# Patient Record
Sex: MALE | Race: WHITE | Employment: OTHER | URBAN - NONMETROPOLITAN AREA
[De-identification: names, ages, dates, MRNs, and addresses within clinical notes are randomized per-mention and may not be internally consistent; named-entity substitution may affect disease eponyms.]

---

## 2017-01-24 ENCOUNTER — HOSPITAL ENCOUNTER (EMERGENCY)
Facility: HOSPITAL | Age: 62
Discharge: HOME | End: 2017-01-24
Attending: PHYSICIAN ASSISTANT | Admitting: PHYSICIAN ASSISTANT
Payer: COMMERCIAL

## 2017-01-24 VITALS
RESPIRATION RATE: 16 BRPM | TEMPERATURE: 98.2 F | DIASTOLIC BLOOD PRESSURE: 60 MMHG | OXYGEN SATURATION: 93 % | SYSTOLIC BLOOD PRESSURE: 107 MMHG

## 2017-01-24 DIAGNOSIS — S00.83XA CONTUSION OF FACE, SCALP AND NECK, INITIAL ENCOUNTER: ICD-10-CM
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
## 2017-01-24 DIAGNOSIS — S10.93XA CONTUSION OF FACE, SCALP AND NECK, INITIAL ENCOUNTER: ICD-10-CM

## 2017-01-24 DIAGNOSIS — S00.03XA CONTUSION OF FACE, SCALP AND NECK, INITIAL ENCOUNTER: ICD-10-CM

## 2017-01-24 DIAGNOSIS — F10.10 ETOH ABUSE: ICD-10-CM

## 2017-01-24 LAB
ALBUMIN SERPL-MCNC: 3.5 G/DL (ref 3.4–5)
ALP SERPL-CCNC: 122 U/L (ref 40–150)
ALT SERPL W P-5'-P-CCNC: 43 U/L (ref 0–70)
ANION GAP SERPL CALCULATED.3IONS-SCNC: 6 MMOL/L (ref 3–14)
APTT PPP: 31 SEC (ref 24–37)
AST SERPL W P-5'-P-CCNC: 62 U/L (ref 0–45)
BASOPHILS # BLD AUTO: 0 10E9/L (ref 0–0.2)
BASOPHILS NFR BLD AUTO: 0.6 %
BILIRUB SERPL-MCNC: 0.2 MG/DL (ref 0.2–1.3)
BUN SERPL-MCNC: 5 MG/DL (ref 7–30)
CALCIUM SERPL-MCNC: 8.3 MG/DL (ref 8.5–10.1)
CHLORIDE SERPL-SCNC: 107 MMOL/L (ref 94–109)
CO2 SERPL-SCNC: 26 MMOL/L (ref 20–32)
CREAT SERPL-MCNC: 0.6 MG/DL (ref 0.66–1.25)
DIFFERENTIAL METHOD BLD: ABNORMAL
EOSINOPHIL # BLD AUTO: 0.1 10E9/L (ref 0–0.7)
EOSINOPHIL NFR BLD AUTO: 1.8 %
ERYTHROCYTE [DISTWIDTH] IN BLOOD BY AUTOMATED COUNT: 15 % (ref 10–15)
ETHANOL SERPL-MCNC: 0.34 G/DL
GFR SERPL CREATININE-BSD FRML MDRD: ABNORMAL ML/MIN/1.7M2
GLUCOSE SERPL-MCNC: 119 MG/DL (ref 70–99)
HCT VFR BLD AUTO: 45.3 % (ref 40–53)
HGB BLD-MCNC: 15.7 G/DL (ref 13.3–17.7)
IMM GRANULOCYTES # BLD: 0.1 10E9/L (ref 0–0.4)
IMM GRANULOCYTES NFR BLD: 0.7 %
INR PPP: 0.81 (ref 0.8–1.2)
LYMPHOCYTES # BLD AUTO: 2.9 10E9/L (ref 0.8–5.3)
LYMPHOCYTES NFR BLD AUTO: 43.4 %
MCH RBC QN AUTO: 33.5 PG (ref 26.5–33)
MCHC RBC AUTO-ENTMCNC: 34.7 G/DL (ref 31.5–36.5)
MCV RBC AUTO: 97 FL (ref 78–100)
MONOCYTES # BLD AUTO: 0.8 10E9/L (ref 0–1.3)
MONOCYTES NFR BLD AUTO: 11.4 %
NEUTROPHILS # BLD AUTO: 2.8 10E9/L (ref 1.6–8.3)
NEUTROPHILS NFR BLD AUTO: 42.1 %
NRBC # BLD AUTO: 0 10*3/UL
NRBC BLD AUTO-RTO: 0 /100
PLATELET # BLD AUTO: 468 10E9/L (ref 150–450)
POTASSIUM SERPL-SCNC: 3.4 MMOL/L (ref 3.4–5.3)
PROT SERPL-MCNC: 7.5 G/DL (ref 6.8–8.8)
RBC # BLD AUTO: 4.69 10E12/L (ref 4.4–5.9)
SODIUM SERPL-SCNC: 139 MMOL/L (ref 133–144)
WBC # BLD AUTO: 6.8 10E9/L (ref 4–11)

## 2017-01-24 PROCEDURE — 80320 DRUG SCREEN QUANTALCOHOLS: CPT | Performed by: PHYSICIAN ASSISTANT

## 2017-01-24 PROCEDURE — 85730 THROMBOPLASTIN TIME PARTIAL: CPT | Performed by: PHYSICIAN ASSISTANT

## 2017-01-24 PROCEDURE — 85610 PROTHROMBIN TIME: CPT | Performed by: PHYSICIAN ASSISTANT

## 2017-01-24 PROCEDURE — 85025 COMPLETE CBC W/AUTO DIFF WBC: CPT | Performed by: PHYSICIAN ASSISTANT

## 2017-01-24 PROCEDURE — 99285 EMERGENCY DEPT VISIT HI MDM: CPT | Mod: 25

## 2017-01-24 PROCEDURE — 72125 CT NECK SPINE W/O DYE: CPT | Mod: TC

## 2017-01-24 PROCEDURE — 99283 EMERGENCY DEPT VISIT LOW MDM: CPT | Performed by: PHYSICIAN ASSISTANT

## 2017-01-24 PROCEDURE — 25000128 H RX IP 250 OP 636: Performed by: PHYSICIAN ASSISTANT

## 2017-01-24 PROCEDURE — 36415 COLL VENOUS BLD VENIPUNCTURE: CPT | Performed by: PHYSICIAN ASSISTANT

## 2017-01-24 PROCEDURE — 96360 HYDRATION IV INFUSION INIT: CPT

## 2017-01-24 PROCEDURE — 80053 COMPREHEN METABOLIC PANEL: CPT | Performed by: PHYSICIAN ASSISTANT

## 2017-01-24 PROCEDURE — 96361 HYDRATE IV INFUSION ADD-ON: CPT

## 2017-01-24 PROCEDURE — 70450 CT HEAD/BRAIN W/O DYE: CPT | Mod: TC

## 2017-01-24 RX ORDER — SODIUM CHLORIDE 9 MG/ML
INJECTION, SOLUTION INTRAVENOUS CONTINUOUS
Status: DISCONTINUED | OUTPATIENT
Start: 2017-01-24 | End: 2017-01-24 | Stop reason: HOSPADM

## 2017-01-24 RX ADMIN — SODIUM CHLORIDE: 9 INJECTION, SOLUTION INTRAVENOUS at 16:29

## 2017-01-24 RX ADMIN — SODIUM CHLORIDE 1000 ML: 9 INJECTION, SOLUTION INTRAVENOUS at 15:18

## 2017-01-24 NOTE — ED AVS SNAPSHOT
[facility]    [address]    [place] [address]    Phone:  [phone]                                       Andrew Virgen   MRN: [id number]    Department:  [facility]   Date of Visit:  1/24/2017           After Visit Summary Signature Page     I have received my discharge instructions, and my questions have been answered. I have discussed any challenges I see with this plan with the nurse or doctor.    ..........................................................................................................................................  Patient/Patient Representative Signature      ..........................................................................................................................................  Patient Representative Print Name and Relationship to Patient    ..................................................               ................................................  Date                                            Time    ..........................................................................................................................................  Reviewed by Signature/Title    ...................................................              ..............................................  Date                                                            Time
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
## 2017-01-24 NOTE — PROGRESS NOTES
Preliminary results for STAT imaging were received at 1602 (time on fax or preliminary report).    Preliminary results for STAT imaging were given to Sarah at 1602 (time) and scanned into PACs at 1602 (time).
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
## 2017-01-25 NOTE — ED NOTES
Home via taxi. Pt remains alert and oriented. GCS 15  Instructed on follow up and to return if worse, steady on feet.
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
## 2017-02-02 ENCOUNTER — HOSPITAL ENCOUNTER (EMERGENCY)
Facility: HOSPITAL | Age: 62
Discharge: HOME | End: 2017-02-02
Attending: FAMILY MEDICINE | Admitting: FAMILY MEDICINE
Payer: COMMERCIAL

## 2017-02-02 VITALS
RESPIRATION RATE: 20 BRPM | TEMPERATURE: 98.1 F | HEART RATE: 97 BPM | OXYGEN SATURATION: 99 % | DIASTOLIC BLOOD PRESSURE: 93 MMHG | SYSTOLIC BLOOD PRESSURE: 105 MMHG

## 2017-02-02 DIAGNOSIS — S82.832A OTHER CLOSED FRACTURE OF DISTAL END OF LEFT FIBULA, INITIAL ENCOUNTER: ICD-10-CM

## 2017-02-02 PROCEDURE — 99283 EMERGENCY DEPT VISIT LOW MDM: CPT | Performed by: FAMILY MEDICINE

## 2017-02-02 PROCEDURE — 73610 X-RAY EXAM OF ANKLE: CPT | Mod: TC,LT

## 2017-02-02 PROCEDURE — 73590 X-RAY EXAM OF LOWER LEG: CPT | Mod: TC,LT

## 2017-02-02 PROCEDURE — 99283 EMERGENCY DEPT VISIT LOW MDM: CPT

## 2017-02-02 RX ORDER — IBUPROFEN 800 MG/1
800 TABLET, FILM COATED ORAL EVERY 8 HOURS PRN
Qty: 60 TABLET | Refills: 0 | COMMUNITY
Start: 2017-02-02 | End: 2017-02-10

## 2017-02-02 ASSESSMENT — ENCOUNTER SYMPTOMS
ARTHRALGIAS: 1
GASTROINTESTINAL NEGATIVE: 1
ACTIVITY CHANGE: 1
CARDIOVASCULAR NEGATIVE: 1
RESPIRATORY NEGATIVE: 1
NUMBNESS: 0

## 2017-02-02 NOTE — ED PROVIDER NOTES
"  History     Chief Complaint   Patient presents with     Fall     L ankle pain     HPI  Andrew Virgen is a 61 year old male who was in the basement organizing his [employer] [place] and fell off his ankle.  He felt an immediate pop and had pain in the lateral ankle, was unable to walk any further on it.  He has some swelling, but good pulses and is moving the rest of the leg without difficulty.  He is intoxicated as usual.    I have reviewed the Medications, Allergies, Past Medical and Surgical History, and Social History in the Epic system.    Review of Systems   Constitutional: Positive for activity change.   HENT: Negative.         States he did not hit his head or lose consciousness.  Intoxicated, states he has had \"some beer\" today.   Respiratory: Negative.    Cardiovascular: Negative.    Gastrointestinal: Negative.    Musculoskeletal: Positive for arthralgias.        Left ankle.   Skin: Negative.         No bruising present.   Neurological: Negative for numbness.        CMS intact distal to break.       Physical Exam   BP: 111/87 mmHg  Pulse: 91  Temp: 97.8  F (36.6  C)  Resp: 18  SpO2: 100 %  Physical Exam   Constitutional: He is oriented to person, place, and time. He appears well-developed and well-nourished. No distress.   HENT:   Head: Normocephalic and atraumatic.   Neck: Normal range of motion. Neck supple.   Cardiovascular: Normal rate, regular rhythm, normal heart sounds and intact distal pulses.    No murmur heard.  Pulmonary/Chest: Effort normal and breath sounds normal.   Abdominal: Soft. Bowel sounds are normal. He exhibits no distension. There is no tenderness.   Musculoskeletal: Normal range of motion. He exhibits edema and tenderness.   Lateral left ankle pain, xray shows fibula fracture, oblique with minimal displacement.   Neurological: He is alert and oriented to person, place, and time.   Skin: Skin is warm and dry.   Psychiatric: He has a normal mood and affect.   Nursing note and vitals " reviewed.      ED Course   Procedures        Labs Ordered and Resulted from Time of ED Arrival Up to the Time of Departure from the ED - No data to display    Assessments & Plan (with Medical Decision Making)   Patient placed in cam walker boot on left ankle.  He was advised to leave it on excepting bathing, and to not bear weight on it.  Shown how to apply ice to area.  Follow up with Dr. Worthington in 3 days.    I have reviewed the nursing notes.    I have reviewed the findings, diagnosis, plan and need for follow up with the patient.    New Prescriptions    IBUPROFEN (ADVIL/MOTRIN) 800 MG TABLET    Take 1 tablet (800 mg) by mouth every 8 hours as needed for moderate pain       Final diagnoses:   Other closed fracture of distal end of left fibula, initial encounter       2/2/2017   [facility]      Yanna Watson MD  02/02/17 [id number]

## 2017-02-02 NOTE — ED AVS SNAPSHOT
[facility]    [address] [address]    Phone:  [phone]                                       Andrew Virgen   MRN: [id number]    Department:  [facility]   Date of Visit:  2/2/2017           After Visit Summary Signature Page     I have received my discharge instructions, and my questions have been answered. I have discussed any challenges I see with this plan with the nurse or doctor.    ..........................................................................................................................................  Patient/Patient Representative Signature      ..........................................................................................................................................  Patient Representative Print Name and Relationship to Patient    ..................................................               ................................................  Date                                            Time    ..........................................................................................................................................  Reviewed by Signature/Title    ...................................................              ..............................................  Date                                                            Time
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
## 2017-02-02 NOTE — ED AVS SNAPSHOT
[facility]    [address]    [place] [address]    Phone:  [phone]                                       Andrew Virgen   MRN: [id number]    Department:  [facility]   Date of Visit:  2/2/2017           Patient Information     Date Of Birth          1955        Your diagnoses for this visit were:     Other closed fracture of distal end of left fibula, initial encounter        You were seen by Yanna Watson MD.      Follow-up Information     Follow up with Gladis Worthington MD In 1 week.    Specialty:  Family Practice    Why:  follow up ankle    Contact information:    [facility] [place]  [address]  [place] [address]  [phone]          Discharge Instructions         Ankle Fracture, Distal Fibula  You have a fracture, or broken bone, of the end of the fibula bone. The fibula is one of two bones that support the ankle joint.    Home care    You will be given a splint, cast, or special boot to prevent movement at the injury site. Do not put weight on a splint. It will break. Follow your healthcare provider s advice about when to begin bearing weight on a cast or boot.    Keep your leg elevated when sitting or lying down. When sleeping, place a pillow under the injured leg. When sitting, support the injured leg so it is level with your waist. This is very important during the first 48 hours.    Keep the cast or splint completely dry at all times. When bathing, protect the cast or splint with 2 large plastic bags. Place 1 bag outside of the other. Tape each bag with duct tape at the top end. Water can still leak in even when the foot is covered. So it's best to keep the cast, splint, or boot away from water. If a fiberglass cast or splint gets wet, dry it with a hair dryer on a cool setting.    Place an ice pack over the injured area for no more than 15 to 20 minutes. Do this every 3 to 6 hours for the first 24 to 48 hours. Continue this 3 to  4 times a day as needed. To make an ice pack, put ice cubes in a plastic bag that seals at the top. Wrap the bag in a clean, thin towel or cloth. Never put ice or an ice pack directly on the skin. The ice pack can be put right on the cast or splint. As the ice melts, be careful that the cast or splint doesn t get wet.    You may use over-the-counter pain medicine to control pain, unless another pain medicine was prescribed. If you have chronic liver or kidney disease or ever had a stomach ulcer or GI bleeding, talk with your provider before using these medicines.  Follow-up care  Follow up with your healthcare provider in 1 week, or as advised. This is to be sure the bone is healing properly. If you were given a splint, it may be changed to a cast after the swelling goes down.  If X-rays were taken, you will be told of any new findings that may affect your care.  When to seek medical advice  Call your healthcare provider right away if any of these occur:    The plaster cast or splint becomes wet or soft    The fiberglass cast or splint stays wet for more than 24 hours    There is increased tightness or pain under the cast or splint    Your toes become swollen, cold, blue, numb, or tingly    The cast becomes loose    The cast has a bad smell    Sore areas develop under the cast    The cast develops cracks or breaks     2329-5878 [employer]. [address] [place] [address]. All rights reserved. This information is not intended as a substitute for professional medical care. Always follow your healthcare professional's instructions.             Review of your medicines      START taking        Dose / Directions Last dose taken    ibuprofen 800 MG tablet   Commonly known as:  ADVIL/MOTRIN   Dose:  800 mg   Quantity:  60 tablet        Take 1 tablet (800 mg) by mouth every 8 hours as needed for moderate pain   Refills:  0          Our records show that you are taking the medicines listed below. If  these are incorrect, please call your family doctor or clinic.        Dose / Directions Last dose taken    clopidogrel 75 MG tablet   Commonly known as:  PLAVIX   Dose:  75 mg   Quantity:  10 tablet        Take 1 tablet (75 mg) by mouth daily   Refills:  0        folic acid 1 MG tablet   Commonly known as:  FOLVITE   Dose:  1 mg   Quantity:  30 tablet        Take 1 tablet (1 mg) by mouth daily   Refills:  0        gabapentin 300 MG capsule   Commonly known as:  NEURONTIN   Dose:  300 mg   Quantity:  40 capsule        Take 1 capsule (300 mg) by mouth 4 times daily   Refills:  0        lisinopril 20 MG tablet   Commonly known as:  PRINIVIL/ZESTRIL   Dose:  20 mg   Quantity:  10 tablet        Take 1 tablet (20 mg) by mouth daily   Refills:  0        magnesium oxide 400 (241.3 MG) MG tablet   Commonly known as:  MAG-OX   Dose:  400 mg   Quantity:  7 tablet        Take 1 tablet (400 mg) by mouth daily   Refills:  0        metoprolol 25 MG 24 hr tablet   Commonly known as:  TOPROL-XL   Dose:  25 mg   Quantity:  10 tablet        Take 1 tablet (25 mg) by mouth daily   Refills:  0        multivitamin, therapeutic with minerals Tabs tablet   Dose:  1 tablet   Quantity:  30 each        Take 1 tablet by mouth daily   Refills:  0        nitroglycerin 0.4 MG sublingual tablet   Commonly known as:  NITROSTAT   Dose:  0.4 mg   Quantity:  10 tablet        Place 1 tablet (0.4 mg) under the tongue every 5 minutes as needed for chest pain At the 1st sign of attack; may repeat every 5 min until relief; if pain persists after 3 tablets in 15 min, prompt medial attention is recommended AS NEEDED   Refills:  0        order for DME   Quantity:  2 Units        Equipment being ordered: bilateral knee sleeves   Refills:  0        simvastatin 5 MG tablet   Commonly known as:  Zocor   Dose:  10 mg   Quantity:  30 tablet        Take 2 tablets (10 mg) by mouth daily   Refills:  0        tamsulosin 0.4 MG capsule   Commonly known as:  FLOMAX  "  Dose:  0.4 mg   Quantity:  30 capsule        Take 1 capsule (0.4 mg) by mouth daily   Refills:  0        thiamine 100 MG tablet   Dose:  100 mg   Quantity:  30 tablet        Take 1 tablet (100 mg) by mouth daily   Refills:  0                Prescriptions were sent or printed at these locations (1 Prescription)                   Other Prescriptions                Not Printed or Sent (1 of 1)         ibuprofen (ADVIL/MOTRIN) 800 MG tablet                Procedures and tests performed during your visit     Ankle XR, G/E 3 views, left    XR Tibia & Fibula Left 2 Views      Orders Needing Specimen Collection     None      Pending Results     Date and Time Order Name Status Description    2017 1236 XR Tibia & Fibula Left 2 Views Preliminary     2017 1236 Ankle XR, G/E 3 views, left Preliminary             Pending Culture Results     No orders found from 2017 to 2/3/2017.            Thank you for choosing [facility]       Thank you for choosing [facility] for your care. Our goal is always to provide you with excellent care. Hearing back from our patients is one way we can continue to improve our services. Please take a few minutes to complete the written survey that you may receive in the mail after you visit with us. Thank you!        [facility] Information     [facility] lets you send messages to your doctor, view your test results, renew your prescriptions, schedule appointments and more. To sign up, go to [email or web address] . Click on \"Log in\" on the left side of the screen, which will take you to the Welcome page. Then click on \"Sign up Now\" on the right side of the page.     You will be asked to enter the access code listed below, as well as some personal information. Please follow the directions to create your username and password.     Your access code is: [email or web address]  Expires: 2017  8:43 PM     Your access code will  in 90 days. If you need help or a new code, please call your [facility] clinic " or [phone].        Care EveryWhere ID     This is your Care EveryWhere ID. This could be used by other organizations to access your [place] medical records  [id number]        After Visit Summary       This is your record. Keep this with you and show to your community pharmacist(s) and doctor(s) at your next visit.
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
## 2017-02-02 NOTE — DISCHARGE INSTRUCTIONS
Ankle Fracture, Distal Fibula  You have a fracture, or broken bone, of the end of the fibula bone. The fibula is one of two bones that support the ankle joint.    Home care    You will be given a splint, cast, or special boot to prevent movement at the injury site. Do not put weight on a splint. It will break. Follow your healthcare provider s advice about when to begin bearing weight on a cast or boot.    Keep your leg elevated when sitting or lying down. When sleeping, place a pillow under the injured leg. When sitting, support the injured leg so it is level with your waist. This is very important during the first 48 hours.    Keep the cast or splint completely dry at all times. When bathing, protect the cast or splint with 2 large plastic bags. Place 1 bag outside of the other. Tape each bag with duct tape at the top end. Water can still leak in even when the foot is covered. So it's best to keep the cast, splint, or boot away from water. If a fiberglass cast or splint gets wet, dry it with a hair dryer on a cool setting.    Place an ice pack over the injured area for no more than 15 to 20 minutes. Do this every 3 to 6 hours for the first 24 to 48 hours. Continue this 3 to 4 times a day as needed. To make an ice pack, put ice cubes in a plastic bag that seals at the top. Wrap the bag in a clean, thin towel or cloth. Never put ice or an ice pack directly on the skin. The ice pack can be put right on the cast or splint. As the ice melts, be careful that the cast or splint doesn t get wet.    You may use over-the-counter pain medicine to control pain, unless another pain medicine was prescribed. If you have chronic liver or kidney disease or ever had a stomach ulcer or GI bleeding, talk with your provider before using these medicines.  Follow-up care  Follow up with your healthcare provider in 1 week, or as advised. This is to be sure the bone is healing properly. If you were given a splint, it may be changed to a  cast after the swelling goes down.  If X-rays were taken, you will be told of any new findings that may affect your care.  When to seek medical advice  Call your healthcare provider right away if any of these occur:    The plaster cast or splint becomes wet or soft    The fiberglass cast or splint stays wet for more than 24 hours    There is increased tightness or pain under the cast or splint    Your toes become swollen, cold, blue, numb, or tingly    The cast becomes loose    The cast has a bad smell    Sore areas develop under the cast    The cast develops cracks or breaks     2340-1087 [employer]. [address] [place] [address]. All rights reserved. This information is not intended as a substitute for professional medical care. Always follow your healthcare professional's instructions.

## 2017-02-04 NOTE — PROGRESS NOTES
Quick Note:    Left Lower Leg / Left Ankle XR reports routed to PCP, Dr. PARKER Worthington. Impression - mildly displaced fracture involving the distal fibula, mild degenerative changes of the ankle. Pt was seen in , CAM walker boot placed left lower leg and advised to follow up with PCP in 3 days.  ______

## 2017-02-04 NOTE — PROGRESS NOTES
The pt called wishing to discuss his left ankle pain. His speech is slurred. According to his record, he has been here many timed for ETOH issues.      I told the pt if he would like any further evaluation he would need to come in to be seen. The pt started to yell at me. I said we are open and will to reevaluate him if he chooses to come back in.    I discussed this pt with Dr Aviles. She agrees this pt should NOT be given narcotic pain medication due to his ETOH abuse.    02/02/2017  TWO VIEWS OF LEFT LOWER LEG     CLINICAL HISTORY: Pain, fall.     FINDINGS: There is a mildly displaced fracture seen in the distal  fibula, proximal to the distal tibiofibular articulation. There is a  tiny calcific density along the proximal aspect of the fibula that  appears to be more likely related to enthesophyte formation. Minimal  degenerative changes are seen within the knee, and mild degenerative  changes are seen within the ankle.     IMPRESSION:  1. MILDLY DISPLACED FRACTURE INVOLVING THE DISTAL FIBULA, PROXIMAL TO  THE DISTAL TIBIOFIBULAR JOINT.     2. MILD DEGENERATIVE CHANGES OF THE ANKLE AND TO A LESSER EXTENT THE  KNEE.     THREE VIEWS OF LEFT ANKLE     FINDINGS: There is a mildly displaced oblique fracture of the distal  fibula that appears to originate laterally proximal to the distal  tibiofibular joint, however, does extend into the distal tibiofibular  articulation. No additional fracture. Mild degenerative changes are  seen at the ankle.     IMPRESSION: MILDLY DISPLACED OBLIQUE DISTAL FIBULAR FACTURE, WITHOUT  EVIDENCE OF APPARENT ANKLE MORTISE COMPROMISE.        Bee Aguillon Certified  Physician Assistant  2/4/2017  3:24 PM  [facility]

## 2017-03-04 ENCOUNTER — HOSPITAL ENCOUNTER (EMERGENCY)
Facility: HOSPITAL | Age: 62
Discharge: HOME | End: 2017-03-04
Attending: PHYSICIAN ASSISTANT | Admitting: PHYSICIAN ASSISTANT
Payer: COMMERCIAL

## 2017-03-04 VITALS
SYSTOLIC BLOOD PRESSURE: 126 MMHG | RESPIRATION RATE: 16 BRPM | DIASTOLIC BLOOD PRESSURE: 81 MMHG | OXYGEN SATURATION: 16 % | HEART RATE: 88 BPM | TEMPERATURE: 98.1 F

## 2017-03-04 DIAGNOSIS — F10.10 ALCOHOL ABUSE: ICD-10-CM

## 2017-03-04 DIAGNOSIS — S82.832A CLOSED FRACTURE OF DISTAL END OF LEFT FIBULA, UNSPECIFIED FRACTURE MORPHOLOGY, INITIAL ENCOUNTER: ICD-10-CM

## 2017-03-04 PROCEDURE — 99283 EMERGENCY DEPT VISIT LOW MDM: CPT

## 2017-03-04 PROCEDURE — 73590 X-RAY EXAM OF LOWER LEG: CPT | Mod: TC,LT

## 2017-03-04 PROCEDURE — 73610 X-RAY EXAM OF ANKLE: CPT | Mod: TC,LT

## 2017-03-04 PROCEDURE — 99283 EMERGENCY DEPT VISIT LOW MDM: CPT | Performed by: PHYSICIAN ASSISTANT

## 2017-03-04 ASSESSMENT — ENCOUNTER SYMPTOMS
FEVER: 0
ABDOMINAL PAIN: 0
SHORTNESS OF BREATH: 0
HEADACHES: 0

## 2017-03-04 NOTE — ED AVS SNAPSHOT
[facility]    [address] [address]    Phone:  [phone]                                       Andrew Virgen   MRN: [id number]    Department:  [facility]   Date of Visit:  3/4/2017           Patient Information     Date Of Birth          1955        Your diagnoses for this visit were:     Closed fracture of distal end of left fibula, unspecified fracture morphology, initial encounter     Alcohol abuse        You were seen by Shira Stone PA-C.      Follow-up Information     Follow up with Carlitos Monte MD.    Specialty:  Orthopedics    Contact information:    [facility] [place]  [address]  [place] [address] [phone]          Follow up with Gladis Worthington MD.    Specialty:  Family Practice    Contact information:    [facility] [place]  [address]  [place] [address]  [phone]          Follow up with [facility].    Specialty:  EMERGENCY MEDICINE    Why:  If symptoms worsen    Contact information:    [address] [address] [phone]    Additional information:    From [facility]: Take [address]. Turn left at [address]. Turn left at the first stoplight on [address]. At the first stop sign, take a right onto [address]. Take a left into the parking lot and continue through until you reach the North enterance of the building.       From [place]: Take [address]. Take the [address] ramp towards [place]. Turn left onto [address]. Take a slight right onto [address] [email or web address] Turn left at the first stoplight on [address]. At the first stop sign, take a right onto [address]. Take a left into the parking lot and continue through until you reach the North enterance of the building.       From [place]: Take [address]. Take a right at [address]. At the first stop sign, take a right onto [address].  Take a left into the parking lot and continue through until you reach the [facility] of the building.         Discharge Instructions         Ankle Fracture, Distal Fibula  You have a fracture, or broken bone, of the end of the fibula bone. The fibula is one of two bones that support the ankle joint.    Home care    You will be given a splint, cast, or special boot to prevent movement at the injury site. Do not put weight on a splint. It will break. Follow your healthcare provider s advice about when to begin bearing weight on a cast or boot.    Keep your leg elevated when sitting or lying down. When sleeping, place a pillow under the injured leg. When sitting, support the injured leg so it is level with your waist. This is very important during the first 48 hours.    Keep the cast or splint completely dry at all times. When bathing, protect the cast or splint with 2 large plastic bags. Place 1 bag outside of the other. Tape each bag with duct tape at the top end. Water can still leak in even when the foot is covered. So it's best to keep the cast, splint, or boot away from water. If a fiberglass cast or splint gets wet, dry it with a hair dryer on a cool setting.    Place an ice pack over the injured area for no more than 15 to 20 minutes. Do this every 3 to 6 hours for the first 24 to 48 hours. Continue this 3 to 4 times a day as needed. To make an ice pack, put ice cubes in a plastic bag that seals at the top. Wrap the bag in a clean, thin towel or cloth. Never put ice or an ice pack directly on the skin. The ice pack can be put right on the cast or splint. As the ice melts, be careful that the cast or splint doesn t get wet.    You may use over-the-counter pain medicine to control pain, unless another pain medicine was prescribed. If you have chronic liver or kidney disease or ever had a stomach ulcer or GI bleeding, talk with your provider before using these medicines.  Follow-up care  Follow up with your  healthcare provider in 1 week, or as advised. This is to be sure the bone is healing properly. If you were given a splint, it may be changed to a cast after the swelling goes down.  If X-rays were taken, you will be told of any new findings that may affect your care.  When to seek medical advice  Call your healthcare provider right away if any of these occur:    The plaster cast or splint becomes wet or soft    The fiberglass cast or splint stays wet for more than 24 hours    There is increased tightness or pain under the cast or splint    Your toes become swollen, cold, blue, numb, or tingly    The cast becomes loose    The cast has a bad smell    Sore areas develop under the cast    The cast develops cracks or breaks     4965-2747 [employer]. [address] [place] [address]. All rights reserved. This information is not intended as a substitute for professional medical care. Always follow your healthcare professional's instructions.      Use your walking boot.    I have placed an order for orthopedics.  Your fracture has not started to heal.      Follow-up with Dr. Worthington as well.      ED Discharge Orders     ORTHOPEDICS ADULT REFERRAL       Your provider has referred you to: [facility]     Please be aware that coverage of these services is subject to the terms and limitations of your health insurance plan.  Call member services at your health plan with any benefit or coverage questions.      Please bring the following to your appointment:    >>   Any x-rays, CTs or MRIs which have been performed.  Contact the facility where they were done to arrange for  prior to your scheduled appointment.    >>   List of current medications   >>   This referral request   >>   Any documents/labs given to you for this referral                     Review of your medicines      Our records show that you are taking the medicines listed below. If these are incorrect, please call your family doctor  or clinic.        Dose / Directions Last dose taken    clopidogrel 75 MG tablet   Commonly known as:  PLAVIX   Dose:  75 mg   Quantity:  10 tablet        Take 1 tablet (75 mg) by mouth daily   Refills:  0        folic acid 1 MG tablet   Commonly known as:  FOLVITE   Dose:  1 mg   Quantity:  30 tablet        Take 1 tablet (1 mg) by mouth daily   Refills:  0        gabapentin 300 MG capsule   Commonly known as:  NEURONTIN   Dose:  300 mg   Quantity:  40 capsule        Take 1 capsule (300 mg) by mouth 4 times daily   Refills:  0        lisinopril 20 MG tablet   Commonly known as:  PRINIVIL/ZESTRIL   Dose:  20 mg   Quantity:  10 tablet        Take 1 tablet (20 mg) by mouth daily   Refills:  0        magnesium oxide 400 (241.3 MG) MG tablet   Commonly known as:  MAG-OX   Dose:  400 mg   Quantity:  7 tablet        Take 1 tablet (400 mg) by mouth daily   Refills:  0        metoprolol 25 MG 24 hr tablet   Commonly known as:  TOPROL-XL   Dose:  25 mg   Quantity:  10 tablet        Take 1 tablet (25 mg) by mouth daily   Refills:  0        multivitamin, therapeutic with minerals Tabs tablet   Dose:  1 tablet   Quantity:  30 each        Take 1 tablet by mouth daily   Refills:  0        nitroglycerin 0.4 MG sublingual tablet   Commonly known as:  NITROSTAT   Dose:  0.4 mg   Quantity:  10 tablet        Place 1 tablet (0.4 mg) under the tongue every 5 minutes as needed for chest pain At the 1st sign of attack; may repeat every 5 min until relief; if pain persists after 3 tablets in 15 min, prompt medial attention is recommended AS NEEDED   Refills:  0        order for DME   Quantity:  2 Units        Equipment being ordered: bilateral knee sleeves   Refills:  0        simvastatin 5 MG tablet   Commonly known as:  ZOCOR   Dose:  10 mg   Quantity:  30 tablet        Take 2 tablets (10 mg) by mouth daily   Refills:  0        tamsulosin 0.4 MG capsule   Commonly known as:  FLOMAX   Dose:  0.4 mg   Quantity:  30 capsule        Take 1  "capsule (0.4 mg) by mouth daily   Refills:  0        thiamine 100 MG tablet   Dose:  100 mg   Quantity:  30 tablet        Take 1 tablet (100 mg) by mouth daily   Refills:  0                Procedures and tests performed during your visit     Ankle XR, G/E 3 views, left    Tib/Fib XR, left      Orders Needing Specimen Collection     None      Pending Results     Date and Time Order Name Status Description    3/4/2017 1914 Tib/Fib XR, left In process     3/4/2017 1914 Ankle XR, G/E 3 views, left In process             Pending Culture Results     No orders found from 3/2/2017 to 3/5/2017.            Thank you for choosing [facility]       Thank you for choosing [facility] for your care. Our goal is always to provide you with excellent care. Hearing back from our patients is one way we can continue to improve our services. Please take a few minutes to complete the written survey that you may receive in the mail after you visit with us. Thank you!        [employer] Information     [employer] lets you send messages to your doctor, view your test results, renew your prescriptions, schedule appointments and more. To sign up, go to [email or web address] . Click on \"Log in\" on the left side of the screen, which will take you to the Welcome page. Then click on \"Sign up Now\" on the right side of the page.     You will be asked to enter the access code listed below, as well as some personal information. Please follow the directions to create your username and password.     Your access code is: [email or web address]  Expires: 2017  8:43 PM     Your access code will  in 90 days. If you need help or a new code, please call your [facility] clinic or [phone].        Care EveryWhere ID     This is your Care EveryWhere ID. This could be used by other organizations to access your [facility] medical records  [id number]        After Visit Summary       This is your record. Keep this with you and show to your community pharmacist(s) and " doctor(s) at your next visit.

## 2017-03-04 NOTE — ED AVS SNAPSHOT
[facility]    [address] [place] [address]    Phone:  [phone]                                       Andrew Virgen   MRN: [id number]    Department[facility]  [facility]   Date of Visit:  3/4/2017           After Visit Summary Signature Page     I have received my discharge instructions, and my questions have been answered. I have discussed any challenges I see with this plan with the nurse or doctor.    ..........................................................................................................................................  Patient/Patient Representative Signature      ..........................................................................................................................................  Patient Representative Print Name and Relationship to Patient    ..................................................               ................................................  Date                                            Time    ..........................................................................................................................................  Reviewed by Signature/Title    ...................................................              ..............................................  Date                                                            Time

## 2017-03-05 NOTE — ED PROVIDER NOTES
"  History     Chief Complaint   Patient presents with     Ankle Pain     broke ankle 1 month ago, in walking boot, painful today     Alcohol Intoxication     drinking \"cream soda and vodka\"     The history is provided by the patient.     Andrew Virgen is a 61 year old male who presented to the ED via EMS for evaluation of left ankle pain. He was seen on 2/2/17 and diagnosed with a distal fibula fracture.  Placed in a walking boot.  He was scheduled for a recheck a few days later but no showed.  He now has some pain in the left ankle.  Denied new fall.  He has of course been drinking all day.  He wants me to \"hurry up so I can get the hell out of here.\"    Past Medical History   Diagnosis Date     Acute myocardial infarction of other specified sit 3/10/2011     Hypertension, benign 3/10/2011     Osteoarthrosis, unspecified whether generalized or  3/10/2011     Other and unspecified alcohol dependence, unspecif 3/10/2011      Past Surgical History   Procedure Laterality Date     Pins in foot       fractures - right     Heart surgery -1 stent  3/2009      Social History     Social History     Marital status: Single     Spouse name: N/A     Number of children: N/A     Years of education: N/A     Social History Main Topics     Smoking status: Current Every Day Smoker     Packs/day: 0.50     Years: 39.00     Types: Cigarettes     Smokeless tobacco: None      Comment: tried to quit - longest period tobacco free: 6 months - passive smoke exposure     Alcohol use Yes      Comment: vodka     Drug use: No     Sexual activity: Not Asked     Other Topics Concern     Blood Transfusions Yes     Caffeine Concern No     Social History Narrative      Family History   Problem Relation Age of Onset     C.A.D. Brother 36     cause of death     DIABETES Brother      DIABETES Brother      HEART DISEASE Brother 39     heart disease - cause of death     Hypertension Father      MENTAL ILLNESS Brother        I have reviewed the " Medications, Allergies, Past Medical and Surgical History, and Social History in the Epic system.    Review of Systems   Constitutional: Negative for fever.   Respiratory: Negative for shortness of breath.    Cardiovascular: Negative for chest pain.   Gastrointestinal: Negative for abdominal pain.   Musculoskeletal:        Left ankle pain    Skin: Negative for rash.   Neurological: Negative for headaches.       Physical Exam   BP: 151/94  Pulse: 88  Heart Rate: 88  Temp: 97.6  F (36.4  C)  Resp: 20  SpO2: 98 %  Physical Exam   Constitutional: He is oriented to person, place, and time. He appears well-developed and well-nourished. No distress.   Pleasant, loud, cursing   Pulmonary/Chest: Effort normal.   Musculoskeletal: He exhibits edema and tenderness. He exhibits no deformity.   Walking boot removed.  Some mild left lateral malleolus edema.  No bruising.  Complains of pain when I palpate his proximal fibula.     Neurological: He is alert and oriented to person, place, and time.   Skin: Skin is warm and dry.   Psychiatric: He has a normal mood and affect.   Nursing note and vitals reviewed.      ED Course     ED Course     Procedures        Ankle shows non-healing left distal fibula fracture.       Critical Care time:  none               Labs Ordered and Resulted from Time of ED Arrival Up to the Time of Departure from the ED - No data to display    Assessments & Plan (with Medical Decision Making)   Discussed the x-ray results.  Referral to ortho.  He has already no-showed his follow-up.  Return here as needed.     I have reviewed the nursing notes.    I have reviewed the findings, diagnosis, plan and need for follow up with the patient.    Discharge Medication List as of 3/4/2017  7:58 PM          Final diagnoses:   Closed fracture of distal end of left fibula, unspecified fracture morphology, initial encounter   Alcohol abuse       3/4/2017   [facility]     Shira Stone PA-C  03/04/17 4817

## 2017-03-05 NOTE — ED NOTES
Pt to xray.  States prior that all the ED staff are worthless and he is going to [facility].  Denies need for pain meds.

## 2017-03-05 NOTE — DISCHARGE INSTRUCTIONS
Ankle Fracture, Distal Fibula  You have a fracture, or broken bone, of the end of the fibula bone. The fibula is one of two bones that support the ankle joint.    Home care    You will be given a splint, cast, or special boot to prevent movement at the injury site. Do not put weight on a splint. It will break. Follow your healthcare provider s advice about when to begin bearing weight on a cast or boot.    Keep your leg elevated when sitting or lying down. When sleeping, place a pillow under the injured leg. When sitting, support the injured leg so it is level with your waist. This is very important during the first 48 hours.    Keep the cast or splint completely dry at all times. When bathing, protect the cast or splint with 2 large plastic bags. Place 1 bag outside of the other. Tape each bag with duct tape at the top end. Water can still leak in even when the foot is covered. So it's best to keep the cast, splint, or boot away from water. If a fiberglass cast or splint gets wet, dry it with a hair dryer on a cool setting.    Place an ice pack over the injured area for no more than 15 to 20 minutes. Do this every 3 to 6 hours for the first 24 to 48 hours. Continue this 3 to 4 times a day as needed. To make an ice pack, put ice cubes in a plastic bag that seals at the top. Wrap the bag in a clean, thin towel or cloth. Never put ice or an ice pack directly on the skin. The ice pack can be put right on the cast or splint. As the ice melts, be careful that the cast or splint doesn t get wet.    You may use over-the-counter pain medicine to control pain, unless another pain medicine was prescribed. If you have chronic liver or kidney disease or ever had a stomach ulcer or GI bleeding, talk with your provider before using these medicines.  Follow-up care  Follow up with your healthcare provider in 1 week, or as advised. This is to be sure the bone is healing properly. If you were given a splint, it may be changed to a  cast after the swelling goes down.  If X-rays were taken, you will be told of any new findings that may affect your care.  When to seek medical advice  Call your healthcare provider right away if any of these occur:    The plaster cast or splint becomes wet or soft    The fiberglass cast or splint stays wet for more than 24 hours    There is increased tightness or pain under the cast or splint    Your toes become swollen, cold, blue, numb, or tingly    The cast becomes loose    The cast has a bad smell    Sore areas develop under the cast    The cast develops cracks or breaks     7083-9438 [employer]. [address] [place], [place] [address]. All rights reserved. This information is not intended as a substitute for professional medical care. Always follow your healthcare professional's instructions.      Use your walking boot.    I have placed an order for orthopedics.  Your fracture has not started to heal.      Follow-up with Dr. Worthington as well.

## 2017-03-05 NOTE — ED NOTES
"Pt presents via ambulance stating he thinks he may have reinjured his left ankle that was fractured approx. One month ago. Pt has a \"boot\" in place. CMs intact. Pt states he was suppose to follow up with his provider a couple weeks ago to get a cast which he states he did not do. Pt states 2 days ago he went to the grocery store and bore too much wt on his left foot. Pt admits to intoxication, when asked how much he had to drink states \"not enough\". Smell of ETOH. Pt speaking loudly, gesturing with arms but is able to be talked down. Cooperative.   "

## 2017-03-05 NOTE — ED NOTES
Discharge papers given to pt. Verbalizes understanding of d/c dx, follow up with ortho MD as PA has ordered, and see PMD.  Pt will not rate pain. Pt will not complete sherry rec with nursing.  States he does not need help donning cam boot but he will wear it. VSS as charted.  Pt remains in room as he is unable to find ride.  Staff attempting to find ride.

## 2017-03-07 NOTE — PROGRESS NOTES
Lt ankle X-Ray report routed to Dr Worthington. IMPRESSION: HEALING SLIGHTLY DISTRACTED LATERAL MALLEOLAR. Pt advised to follow up with PCP in 7 days.

## 2017-03-08 ENCOUNTER — OFFICE VISIT (OUTPATIENT)
Dept: ORTHOPEDICS | Facility: OTHER | Age: 62
End: 2017-03-08
Attending: PHYSICIAN ASSISTANT
Payer: COMMERCIAL

## 2017-03-08 VITALS
DIASTOLIC BLOOD PRESSURE: 82 MMHG | TEMPERATURE: 99.2 F | BODY MASS INDEX: 22.9 KG/M2 | SYSTOLIC BLOOD PRESSURE: 152 MMHG | WEIGHT: 160 LBS | HEIGHT: 70 IN | HEART RATE: 74 BPM | OXYGEN SATURATION: 97 % | RESPIRATION RATE: 18 BRPM

## 2017-03-08 DIAGNOSIS — S82.832A CLOSED FRACTURE OF DISTAL END OF LEFT FIBULA, UNSPECIFIED FRACTURE MORPHOLOGY, INITIAL ENCOUNTER: ICD-10-CM

## 2017-03-08 PROCEDURE — 99213 OFFICE O/P EST LOW 20 MIN: CPT

## 2017-03-08 PROCEDURE — 99203 OFFICE O/P NEW LOW 30 MIN: CPT | Performed by: ORTHOPAEDIC SURGERY

## 2017-03-08 ASSESSMENT — PAIN SCALES - GENERAL: PAINLEVEL: MODERATE PAIN (4)

## 2017-03-08 NOTE — PROGRESS NOTES
SUBJECTIVE:                                                    Andrew Virgen is a 61 year old male who presents to clinic today for the following health issues:      Musculoskeletal problem/pain      Duration: 4 weeks    Description  Location: left ankle    Intensity:  moderate    Accompanying signs and symptoms: radiation of pain to foot and swelling in (foot)    History  Previous similar problem: no   Previous evaluation:  x-ray    Precipitating or alleviating factors:  Trauma or overuse: YES  Aggravating factors include: standing, walking, climbing stairs and has not been walking on it.    Therapies tried and outcome:            Problem list and histories reviewed & adjusted, as indicated.  Additional history:     Patient Active Problem List   Diagnosis     Acute alcohol intoxication, uncomplicated (H)     Chest pain of uncertain etiology     Acidosis - suspect alcoholic ketoacidosis     Hypokalemia     Hypomagnesemia     ETOH abuse     Possible - Urinary tract infection      Alcoholic ketoacidosis     Past Surgical History   Procedure Laterality Date     Pins in foot       fractures - right     Heart surgery -1 stent  3/2009       Social History   Substance Use Topics     Smoking status: Current Every Day Smoker     Packs/day: 0.50     Years: 39.00     Types: Cigarettes     Smokeless tobacco: Not on file      Comment: tried to quit - longest period tobacco free: 6 months - passive smoke exposure     Alcohol use Yes      Comment: jb     Family History   Problem Relation Age of Onset     C.A.D. Brother 36     cause of death     DIABETES Brother      DIABETES Brother      HEART DISEASE Brother 39     heart disease - cause of death     Hypertension Father      MENTAL ILLNESS Brother          Allergies   Allergen Reactions     Cocaine Shortness Of Breath and Other (See Comments)     Respiratory distress     Codeine      From [facility] record.      Penicillins        Reviewed and updated as  needed this visit by clinical staff  Allergies  Meds       Reviewed and updated as needed this visit by Provider           Chief complaint is pain in the left ankle    : History chief complaint presently 5 weeks ago patient sustained a twisting injury to his ankle.  He went to the emergency room and short oblique fracture of the left distal fibula was diagnosed.  He is placed in a cam walking boot and instructed to avoid weightbearing and follow up with his primary care for definitive treatment.  The patient did not make any appointments with his primary care provider and was seen back in the emergency room approximately 1 week ago where new radiographs demonstrated that the fracture was healing very slowly and had displaced slightly.    Past medical history: Past medical history is positive for hyperlipidemia hypertension chronic pain alcoholism and arteriosclerotic cardiovascular disease.    Review of systems: Patient denies any recent changes in his health any unexpected or unexplained weight loss no fever no chills.    Physical exam:    General: Patient is a adult male in no obvious distress he appears alert oriented and reasonably healthy he is in a wheelchair.    HEENT: Patient has full range of motion of the cervical spine vision and hearing appear normal no masses or tenderness noted in the C-spine    Chest: Patient has normal excursion of the chest with known rib or spine deformities    Cardiovascular: Patient has brisk radial pulses with no evidence of any palpitations or dysrhythmia    : Musculoskeletal: The left ankle was examined there is minimal swelling or edema, there is point tenderness over the distal fibula with no crepitation.  Gentle stress testing demonstrates no apparent motion at the fracture site and the limb is neurovascularly intact.    X-rays x-rays demonstrate a long oblique fracture of the distal fibula that begins anteriorly at the level of the ankle joint and extends superiorly in a  Groves B pattern.  There is some callus formation noted in the fracture gap.  Overall ankle alignment is satisfactory.    Assessment: Assessment is delayed union of the fibula fracture, most likely secondary to his tobacco abuse.    Plan: Continue in the cam boot allowed gentle protected weightbearing remove the boot only for hygiene and gentle range of motion.  Follow up in 2 weeks for repeat exam and radiograph

## 2017-03-08 NOTE — NURSING NOTE
"Chief Complaint   Patient presents with     Musculoskeletal Problem     left ankle       Initial /82 (BP Location: Left arm, Patient Position: Chair, Cuff Size: Adult Regular)  Pulse 74  Temp 99.2  F (37.3  C) (Tympanic)  Resp 18  Ht 5' 10\" (1.778 m)  Wt 160 lb (72.6 kg)  SpO2 97%  BMI 22.96 kg/m2 Estimated body mass index is 22.96 kg/(m^2) as calculated from the following:    Height as of this encounter: 5' 10\" (1.778 m).    Weight as of this encounter: 160 lb (72.6 kg).  Medication Reconciliation: complete   Pamela M Lechevalier LPN      "

## 2017-03-08 NOTE — MR AVS SNAPSHOT
"              After Visit Summary   3/8/2017    Andrew Virgen    MRN: [id number]           Patient Information     Date Of Birth          1955        Visit Information        Provider Department      3/8/2017 1:30 PM Michael Moreno DO  ORTHOPEDICS        Today's Diagnoses     Closed fracture of distal end of left fibula, unspecified fracture morphology, initial encounter           Follow-ups after your visit        Follow-up notes from your care team     Return in about 2 weeks (around 3/22/2017) for Routine Visit.      Your next 10 appointments already scheduled     Mar 23, 2017 11:15 AM CDT   (Arrive by 11:00 AM)   Return Visit with Michael Moreno DO    ORTHOPEDICS ([facility])    [address] [facility] [address] [phone]              Who to contact     If you have questions or need follow up information about today's clinic visit or your schedule please contact  ORTHOPEDICS directly at [phone].  Normal or non-critical lab and imaging results will be communicated to you by [facility], letter or phone within 4 business days after the clinic has received the results. If you do not hear from us within 7 days, please contact the clinic through [facility] [employer] or phone. If you have a critical or abnormal lab result, we will notify you by phone as soon as possible.  Submit refill requests through [email or web address] or call your pharmacy and they will forward the refill request to us. Please allow 3 business days for your refill to be completed.          Additional Information About Your Visit        [facility]ah[employer] Information     [email or web address] lets you send messages to your doctor, view your test results, renew your prescriptions, schedule appointments and more. To sign up, go to [email or web address] . Click on \"Log in\" on the left side of the screen, which will take you to the Welcome page. Then click on \"Sign up Now\" on the right side of the page.     You will be asked to enter the access code listed below, " "as well as some personal information. Please follow the directions to create your username and password.     Your access code is: [email or web address]  Expires: 2017  8:43 PM     Your access code will  in 90 days. If you need help or a new code, please call your [facility] or [phone].        Care EveryWhere ID     This is your Care EveryWhere ID. This could be used by other organizations to access your [facility] medical records  [id number]        Your Vitals Were     Pulse Temperature Respirations Height Pulse Oximetry BMI (Body Mass Index)    74 99.2  F (37.3  C) (Tympanic) 18 5' 10\" (1.778 m) 97% 22.96 kg/m2       Blood Pressure from Last 3 Encounters:   17 152/82   17 126/81   17 105/93    Weight from Last 3 Encounters:   17 160 lb (72.6 kg)   16 147 lb 14.9 oz (67.1 kg)   16 175 lb (79.4 kg)              Today, you had the following     No orders found for display       Primary Care Provider Office Phone # Fax #    Gladis Worthington [phone]       [facility] [place] [address]  [place] [address]        Thank you!     Thank you for choosing  ORTHOPEDICS  for your care. Our goal is always to provide you with excellent care. Hearing back from our patients is one way we can continue to improve our services. Please take a few minutes to complete the written survey that you may receive in the mail after your visit with us. Thank you!             Your Updated Medication List - Protect others around you: Learn how to safely use, store and throw away your medicines at www.disposemymeds.org.          This list is accurate as of: 3/8/17  2:04 PM.  Always use your most recent med list.                   Brand Name Dispense Instructions for use    clopidogrel 75 MG tablet    PLAVIX    10 tablet    Take 1 tablet (75 mg) by mouth daily       folic acid 1 MG tablet    FOLVITE    30 tablet    Take 1 tablet (1 mg) by mouth daily       gabapentin 300 " MG capsule    NEURONTIN    40 capsule    Take 1 capsule (300 mg) by mouth 4 times daily       lisinopril 20 MG tablet    PRINIVIL/ZESTRIL    10 tablet    Take 1 tablet (20 mg) by mouth daily       magnesium oxide 400 (241.3 MG) MG tablet    MAG-OX    7 tablet    Take 1 tablet (400 mg) by mouth daily       metoprolol 25 MG 24 hr tablet    TOPROL-XL    10 tablet    Take 1 tablet (25 mg) by mouth daily       multivitamin, therapeutic with minerals Tabs tablet     30 each    Take 1 tablet by mouth daily       nitroglycerin 0.4 MG sublingual tablet    NITROSTAT    10 tablet    Place 1 tablet (0.4 mg) under the tongue every 5 minutes as needed for chest pain At the 1st sign of attack; may repeat every 5 min until relief; if pain persists after 3 tablets in 15 min, prompt medial attention is recommended AS NEEDED       order for DME     2 Units    Equipment being ordered: bilateral knee sleeves       simvastatin 5 MG tablet    ZOCOR    30 tablet    Take 2 tablets (10 mg) by mouth daily       tamsulosin 0.4 MG capsule    FLOMAX    30 capsule    Take 1 capsule (0.4 mg) by mouth daily       thiamine 100 MG tablet     30 tablet    Take 1 tablet (100 mg) by mouth daily

## 2017-03-14 ENCOUNTER — TELEPHONE (OUTPATIENT)
Dept: FAMILY MEDICINE | Facility: OTHER | Age: 62
End: 2017-03-14

## 2017-03-14 NOTE — TELEPHONE ENCOUNTER
I called and spoke with Rubin who states pt needs a PCA.  I explained that pt needs to be seen in the clinic or ER to be evaluated.  He will speak with pt and see if he will come in for evaluation.

## 2017-03-14 NOTE — TELEPHONE ENCOUNTER
10:19 AM    Reason for Call: Phone Call    Description: Pts half brother called and states that Andrew had surgery pins in his knee, just wanted to let someone know that he is living in a basement and that he is dragging himself up the steps and that he needs to follow up with you or get a PCA in there to help him. Would like a call back regarding this.     Was an appointment offered for this call? No    Preferred method for responding to this message: Telephone [phone]    If we cannot reach you directly, may we leave a detailed response at the number you provided? Yes    Can this message wait until your PCP/provider returns, if available today? Not applicable,     Ely Vasquez

## 2017-03-17 ENCOUNTER — HOSPITAL ENCOUNTER (EMERGENCY)
Facility: HOSPITAL | Age: 62
Discharge: LEFT BEFORE DISCHARGE | End: 2017-03-17
Admitting: FAMILY MEDICINE
Payer: COMMERCIAL

## 2017-03-17 VITALS
DIASTOLIC BLOOD PRESSURE: 96 MMHG | TEMPERATURE: 97 F | OXYGEN SATURATION: 95 % | SYSTOLIC BLOOD PRESSURE: 159 MMHG | RESPIRATION RATE: 20 BRPM | HEART RATE: 105 BPM

## 2017-03-17 DIAGNOSIS — S82.892A ANKLE FRACTURE, LEFT: Primary | ICD-10-CM

## 2017-03-17 PROCEDURE — 40000268 ZZH STATISTIC NO CHARGES

## 2017-03-17 NOTE — ED NOTES
"Pt brought to ED by taxi. Pt unable to states reason for presentation, but states he is unable to get into his house due to snow piles and he is scheduled for surgery tomorrow and doesn't know what to do. Pt smells strongly of alcohol and when asked how much he had to drink today reports \"not enough\". Pt states he was at a friends house today drinking \"Bill\". Has complaints of left ankle pain due to fx 8 wks ago. Due for surgery on limp tomorrow.   "

## 2017-03-18 NOTE — ED NOTES
Pt reporting he would just like to go home. Would like us to call  Cab for him. i contacted. Pt awaiting their arrival.

## 2017-04-04 DIAGNOSIS — M25.572 ACUTE LEFT ANKLE PAIN: Primary | ICD-10-CM

## 2017-04-06 ENCOUNTER — OFFICE VISIT (OUTPATIENT)
Dept: ORTHOPEDICS | Facility: OTHER | Age: 62
End: 2017-04-06
Attending: ORTHOPAEDIC SURGERY
Payer: COMMERCIAL

## 2017-04-06 ENCOUNTER — APPOINTMENT (OUTPATIENT)
Dept: GENERAL RADIOLOGY | Facility: OTHER | Age: 62
End: 2017-04-06
Attending: ORTHOPAEDIC SURGERY
Payer: COMMERCIAL

## 2017-04-06 VITALS
HEART RATE: 119 BPM | HEIGHT: 70 IN | TEMPERATURE: 98.2 F | WEIGHT: 170 LBS | SYSTOLIC BLOOD PRESSURE: 120 MMHG | DIASTOLIC BLOOD PRESSURE: 82 MMHG | BODY MASS INDEX: 24.34 KG/M2 | OXYGEN SATURATION: 97 %

## 2017-04-06 DIAGNOSIS — S82.832D CLOSED FRACTURE OF DISTAL END OF LEFT FIBULA WITH ROUTINE HEALING, UNSPECIFIED FRACTURE MORPHOLOGY, SUBSEQUENT ENCOUNTER: Primary | ICD-10-CM

## 2017-04-06 PROCEDURE — 99212 OFFICE O/P EST SF 10 MIN: CPT | Performed by: ORTHOPAEDIC SURGERY

## 2017-04-06 PROCEDURE — 99212 OFFICE O/P EST SF 10 MIN: CPT

## 2017-04-06 PROCEDURE — 73610 X-RAY EXAM OF ANKLE: CPT | Mod: TC,LT

## 2017-04-06 ASSESSMENT — PAIN SCALES - GENERAL: PAINLEVEL: MILD PAIN (3)

## 2017-04-06 NOTE — PROGRESS NOTES
"Patient presents today for follow-up after his left ankle fracture.  This occurred approximately 6 weeks ago.  He is walking fully weightbearing in his cam boot using his walker only for balance.  He states it still has some discomfort but the acute pain and severe pain is completely resolved.    Physical exam: Ecchymosis and edema have completely resolved, there is mild point tenderness over the distal fibula, no fracture motion is noted.  X-rays demonstrate the very advanced healing of this [place] B left distal fibula fracture.    Assessment and plan: Patient may transfer into regular foot wear over the next few days, use the cam boot as needed.  He should follow-up when necessary failure to make satisfactory progress, or any increased pain./82 (BP Location: Right arm, Patient Position: Chair, Cuff Size: Adult Regular)  Pulse 119  Temp 98.2  F (36.8  C) (Tympanic)  Ht 5' 10\" (1.778 m)  Wt 170 lb (77.1 kg)  SpO2 97%  BMI 24.39 kg/m2  "

## 2017-04-06 NOTE — NURSING NOTE
"Chief Complaint   Patient presents with     RECHECK     Follow up left ankle.       Initial /82 (BP Location: Right arm, Patient Position: Chair, Cuff Size: Adult Regular)  Pulse 119  Temp 98.2  F (36.8  C) (Tympanic)  Ht 5' 10\" (1.778 m)  Wt 170 lb (77.1 kg)  SpO2 97%  BMI 24.39 kg/m2 Estimated body mass index is 24.39 kg/(m^2) as calculated from the following:    Height as of this encounter: 5' 10\" (1.778 m).    Weight as of this encounter: 170 lb (77.1 kg).  Medication Reconciliation: complete   Sharon Yan LPN      "

## 2017-04-06 NOTE — MR AVS SNAPSHOT
"              After Visit Summary   2017    Andrew Virgen    MRN: [id number]           Patient Information     Date Of Birth          1955        Visit Information        Provider Department      2017 11:15 AM Michael Moreno, DO  ORTHOPEDICS         Follow-ups after your visit        Who to contact     If you have questions or need follow up information about today's clinic visit or your schedule please contact  ORTHOPEDICS directly at [phone].  Normal or non-critical lab and imaging results will be communicated to you by [employer] of Wonder[employer], letter or phone within 4 business days after the clinic has received the results. If you do not hear from us within 7 days, please contact the clinic through arGEN-Xt or phone. If you have a critical or abnormal lab result, we will notify you by phone as soon as possible.  Submit refill requests through 3nder or call your pharmacy and they will forward the refill request to us. Please allow 3 business days for your refill to be completed.          Additional Information About Your Visit        [facility] [place] Information     3nder lets you send messages to your doctor, view your test results, renew your prescriptions, schedule appointments and more. To sign up, go to [email or web address] . Click on \"Log in\" on the left side of the screen, which will take you to the Welcome page. Then click on \"Sign up Now\" on the right side of the page.     You will be asked to enter the access code listed below, as well as some personal information. Please follow the directions to create your username and password.     Your access code is: [email or web address]  Expires: 2017  9:43 PM     Your access code will  in 90 days. If you need help or a new code, please call your [facility] clinic or [phone].        Care EveryWhere ID     This is your Care EveryWhere ID. This could be used by other organizations to access your [facility] medical records  [id number]        Your Vitals " "Were     Pulse Temperature Height Pulse Oximetry BMI (Body Mass Index)       119 98.2  F (36.8  C) (Tympanic) 5' 10\" (1.778 m) 97% 24.39 kg/m2        Blood Pressure from Last 3 Encounters:   04/06/17 120/82   03/17/17 159/96   03/08/17 152/82    Weight from Last 3 Encounters:   04/06/17 170 lb (77.1 kg)   03/08/17 160 lb (72.6 kg)   09/23/16 147 lb 14.9 oz (67.1 kg)              Today, you had the following     No orders found for display       Primary Care Provider Office Phone # Fax #    Tin MITZI Falk,  [phone]       [facility] [address] [place] [address] ROSEANNA  [facility] [address]        Thank you!     Thank you for choosing  ORTHOPEDICS  for your care. Our goal is always to provide you with excellent care. Hearing back from our patients is one way we can continue to improve our services. Please take a few minutes to complete the written survey that you may receive in the mail after your visit with us. Thank you!             Your Updated Medication List - Protect others around you: Learn how to safely use, store and throw away your medicines at www.disposemymeds.org.          This list is accurate as of: 4/6/17 12:01 PM.  Always use your most recent med list.                   Brand Name Dispense Instructions for use    clopidogrel 75 MG tablet    PLAVIX    10 tablet    Take 1 tablet (75 mg) by mouth daily       folic acid 1 MG tablet    FOLVITE    30 tablet    Take 1 tablet (1 mg) by mouth daily       gabapentin 300 MG capsule    NEURONTIN    40 capsule    Take 1 capsule (300 mg) by mouth 4 times daily       lisinopril 20 MG tablet    PRINIVIL/ZESTRIL    10 tablet    Take 1 tablet (20 mg) by mouth daily       magnesium oxide 400 (241.3 MG) MG tablet    MAG-OX    7 tablet    Take 1 tablet (400 mg) by mouth daily       metoprolol 25 MG 24 hr tablet    TOPROL-XL    10 tablet    Take 1 tablet (25 mg) by mouth daily       multivitamin, therapeutic with minerals Tabs tablet     30 each    Take " 1 tablet by mouth daily       nitroglycerin 0.4 MG sublingual tablet    NITROSTAT    10 tablet    Place 1 tablet (0.4 mg) under the tongue every 5 minutes as needed for chest pain At the 1st sign of attack; may repeat every 5 min until relief; if pain persists after 3 tablets in 15 min, prompt medial attention is recommended AS NEEDED       order for DME     2 Units    Equipment being ordered: bilateral knee sleeves       simvastatin 5 MG tablet    ZOCOR    30 tablet    Take 2 tablets (10 mg) by mouth daily       tamsulosin 0.4 MG capsule    FLOMAX    30 capsule    Take 1 capsule (0.4 mg) by mouth daily       thiamine 100 MG tablet     30 tablet    Take 1 tablet (100 mg) by mouth daily

## 2017-04-28 ENCOUNTER — TELEPHONE (OUTPATIENT)
Dept: ORTHOPEDICS | Facility: OTHER | Age: 62
End: 2017-04-28

## 2017-04-28 NOTE — TELEPHONE ENCOUNTER
"Patient called and stated \" that Dr. Moreno was suppose to order a Right hinged Patella stabilizing brace and that was April 6, 2017 when I saw him last, patient states \" I was given a left one and the nurse had to go to hospital and get it and stated we only have a left one but when another one comes in which they stock on Friday , will give me a call, no one has called\", Patient advised that Dr. Moreno will not be in on Monday and Tuesday and may be in a different facility on Wednesday, but will be back on Thursday, Patient advised writer he would like to go to [facility] and get the make and model of knee brace that [facility] had suggested one other time and he will be calling writer back for details and in hopes that Dr. Rodriguez will let him have this one. Patient advised will wait for details and go from there. FYI..... call will be coming in from patient or orders from  of knee brace patient wants.  Caryn Suarez LPN    "

## 2017-05-03 NOTE — TELEPHONE ENCOUNTER
Called times 3 and message states the number you are trying to reach is unreachable.  I will make a note of brace number on Thursday when in office.    Pamela M Lechevalier LPN

## 2017-05-04 NOTE — TELEPHONE ENCOUNTER
Called and phone states the person you are trying to reach is unavailable.  Will try again later.  Pamela M Lechevalier LPN

## 2017-05-08 ENCOUNTER — HOSPITAL ENCOUNTER (EMERGENCY)
Facility: HOSPITAL | Age: 62
Discharge: HOME | End: 2017-05-08
Attending: PHYSICIAN ASSISTANT | Admitting: PHYSICIAN ASSISTANT
Payer: COMMERCIAL

## 2017-05-08 VITALS
DIASTOLIC BLOOD PRESSURE: 93 MMHG | TEMPERATURE: 98.3 F | RESPIRATION RATE: 16 BRPM | SYSTOLIC BLOOD PRESSURE: 146 MMHG | OXYGEN SATURATION: 96 %

## 2017-05-08 DIAGNOSIS — S82.892G ANKLE FRACTURE, LEFT, CLOSED, WITH DELAYED HEALING, SUBSEQUENT ENCOUNTER: ICD-10-CM

## 2017-05-08 PROCEDURE — 73610 X-RAY EXAM OF ANKLE: CPT | Mod: TC,LT

## 2017-05-08 PROCEDURE — 99284 EMERGENCY DEPT VISIT MOD MDM: CPT | Mod: 25

## 2017-05-08 PROCEDURE — 99284 EMERGENCY DEPT VISIT MOD MDM: CPT | Performed by: PHYSICIAN ASSISTANT

## 2017-05-08 PROCEDURE — 25000125 ZZHC RX 250: Performed by: PHYSICIAN ASSISTANT

## 2017-05-08 PROCEDURE — 96365 THER/PROPH/DIAG IV INF INIT: CPT

## 2017-05-08 RX ORDER — ACETAMINOPHEN 10 MG/ML
1000 INJECTION, SOLUTION INTRAVENOUS ONCE
Status: COMPLETED | OUTPATIENT
Start: 2017-05-08 | End: 2017-05-08

## 2017-05-08 RX ADMIN — ACETAMINOPHEN 1000 MG: 10 INJECTION, SOLUTION INTRAVENOUS at 16:36

## 2017-05-08 NOTE — ED NOTES
Pt presents via ambulance with c/o left foot/ankle pain. Previous fracture of left ankle in February 2017. Pt was in bed when ambulance arrived. Pt relates he has been drinking since 0930 today (rick 1.75 bottle, 3/4 empty.) Pt states it is more swollen today. Denies reinjuring his leg.

## 2017-05-08 NOTE — ED AVS SNAPSHOT
[facility]    [address] [place] [address]    Phone:  [phone]                                       Andrew Virgen   MRN: [id number]    Department:  [facility]   Date of Visit:  5/8/2017           After Visit Summary Signature Page     I have received my discharge instructions, and my questions have been answered. I have discussed any challenges I see with this plan with the nurse or doctor.    ..........................................................................................................................................  Patient/Patient Representative Signature      ..........................................................................................................................................  Patient Representative Print Name and Relationship to Patient    ..................................................               ................................................  Date                                            Time    ..........................................................................................................................................  Reviewed by Signature/Title    ...................................................              ..............................................  Date                                                            Time

## 2017-05-08 NOTE — ED NOTES
Talking with patient and patient reported he is an alcoholic and has been for many years. Offered detox, patient denied.

## 2017-05-08 NOTE — ED NOTES
Patient rita and reporting he just want to get out of here, talked with patient and told him radiologist needs to read it. Patient reported he will try and wait patiently

## 2017-05-08 NOTE — ED AVS SNAPSHOT
[facility]    [address]    [place] [address]    Phone:  [phone]                                       Andrew Virgen   MRN: [id number]    Department:  [facility]   Date of Visit:  5/8/2017           Patient Information     Date Of Birth          1955        Your diagnoses for this visit were:     Ankle fracture, left, closed, with delayed healing, subsequent encounter        You were seen by Mary Anne Martinez PA-C.      Follow-up Information     Follow up with Carlitos Monte MD In 3 days.    Specialty:  Orthopedics    Contact information:    [facility]  [address]  [place] [address]  [phone]          Discharge Instructions       Follow up with orthopedics as was previously recommended. Take tylenol as directed for pain. Anything stronger is contraindicated with your drinking. If you change your mind about nursing home placement for short term rehab, return here or call to speak with our .     Leg or Arm Fractures  Bones can break (fracture) as a result of a fall, blow, or other trauma. Most fractures aren't life-threatening, but may be very painful and lead to serious problems if not treated properly. Getting proper treatment is crucial for a healthy recovery.  When to go to the Emergency Room (ER)    Any fracture to the leg or arm should be considered a medical emergency. The arm or leg should not be moved until help arrives. Don't attempt to straighten or adjust the bone. This can damage the bone and injure nearby blood vessels and nerves. If an open wound is present, cover it with a clean cloth to keep it from getting dirty and to help prevent infection.  What to expect in the ER  Here is what will happen in the ER:     A healthcare provider will ask about your injury and examine you carefully.    Any wounds you have will be cleaned.    X-rays of the injured area will be taken.  Treatment  Treatment depends on where the  bone is broken and whether there is an open wound. If you have an open wound, you may receive IV antibiotics and have the wound flushed with sterile water. Most fractures are treated in two stages:    Reduction. The bone is put back into its proper position, if needed.    Immobilization. The bone is held in place so it doesn't move as it heals. For many arm or leg fractures, this is done with a splint or cast. Serious or compound fractures may first need surgical repair. In that case, you will be referred to a bone specialist (orthopaedic surgeon).  Signs of a leg or arm fracture  Here is what to look for:    The leg or arm is crooked    A joint looks out of place    A bone protrudes from the skin    Weight can't be put on the leg or arm    The limb swells or is very painful    The limb is numb or tingles    A popping or snapping was heard during the injury    Bruising     9576-2205 [employer]. [address] [place] [address]. All rights reserved. This information is not intended as a substitute for professional medical care. Always follow your healthcare professional's instructions.             Review of your medicines      Our records show that you are taking the medicines listed below. If these are incorrect, please call your family doctor or clinic.        Dose / Directions Last dose taken    clopidogrel 75 MG tablet   Commonly known as:  PLAVIX   Dose:  75 mg   Quantity:  10 tablet        Take 1 tablet (75 mg) by mouth daily   Refills:  0        folic acid 1 MG tablet   Commonly known as:  FOLVITE   Dose:  1 mg   Quantity:  30 tablet        Take 1 tablet (1 mg) by mouth daily   Refills:  0        gabapentin 300 MG capsule   Commonly known as:  NEURONTIN   Dose:  300 mg   Quantity:  40 capsule        Take 1 capsule (300 mg) by mouth 4 times daily   Refills:  0        lisinopril 20 MG tablet   Commonly known as:  PRINIVIL/ZESTRIL   Dose:  20 mg   Quantity:  10 tablet        Take 1 tablet  (20 mg) by mouth daily   Refills:  0        magnesium oxide 400 (241.3 MG) MG tablet   Commonly known as:  MAG-OX   Dose:  400 mg   Quantity:  7 tablet        Take 1 tablet (400 mg) by mouth daily   Refills:  0        metoprolol 25 MG 24 hr tablet   Commonly known as:  TOPROL-XL   Dose:  25 mg   Quantity:  10 tablet        Take 1 tablet (25 mg) by mouth daily   Refills:  0        multivitamin, therapeutic with minerals Tabs tablet   Dose:  1 tablet   Quantity:  30 each        Take 1 tablet by mouth daily   Refills:  0        nitroglycerin 0.4 MG sublingual tablet   Commonly known as:  NITROSTAT   Dose:  0.4 mg   Quantity:  10 tablet        Place 1 tablet (0.4 mg) under the tongue every 5 minutes as needed for chest pain At the 1st sign of attack; may repeat every 5 min until relief; if pain persists after 3 tablets in 15 min, prompt medial attention is recommended AS NEEDED   Refills:  0        order for DME   Quantity:  2 Units        Equipment being ordered: bilateral knee sleeves   Refills:  0        simvastatin 5 MG tablet   Commonly known as:  ZOCOR   Dose:  10 mg   Quantity:  30 tablet        Take 2 tablets (10 mg) by mouth daily   Refills:  0        tamsulosin 0.4 MG capsule   Commonly known as:  FLOMAX   Dose:  0.4 mg   Quantity:  30 capsule        Take 1 capsule (0.4 mg) by mouth daily   Refills:  0        thiamine 100 MG tablet   Dose:  100 mg   Quantity:  30 tablet        Take 1 tablet (100 mg) by mouth daily   Refills:  0                Procedures and tests performed during your visit     Ankle XR, G/E 3 views, left      Orders Needing Specimen Collection     None      Pending Results     Date and Time Order Name Status Description    5/8/2017 1632 Ankle XR, G/E 3 views, left In process             Pending Culture Results     No orders found from 5/6/2017 to 5/9/2017.            Thank you for choosing [facility]       Thank you for choosing [facility] for your care. Our goal is always to provide you with  "excellent care. Hearing back from our patients is one way we can continue to improve our services. Please take a few minutes to complete the written survey that you may receive in the mail after you visit with us. Thank you!        [employer] Information     [employer] lets you send messages to your doctor, view your test results, renew your prescriptions, schedule appointments and more. To sign up, go to [email or web address] . Click on \"Log in\" on the left side of the screen, which will take you to the Welcome page. Then click on \"Sign up Now\" on the right side of the page.     You will be asked to enter the access code listed below, as well as some personal information. Please follow the directions to create your username and password.     Your access code is: [email or web address]  Expires: 2017  5:40 PM     Your access code will  in 90 days. If you need help or a new code, please call your [facility] clinic or [phone].        Care EveryWhere ID     This is your Care EveryWhere ID. This could be used by other organizations to access your [facility] medical records  [id number]        After Visit Summary       This is your record. Keep this with you and show to your community pharmacist(s) and doctor(s) at your next visit.                  "

## 2017-05-08 NOTE — ED NOTES
Discharge instructions reviewed with patient.  Encouraged to return with new or worsening symptoms.  No questions or concerns, Patient got himself dressed. To  via A1

## 2017-05-08 NOTE — DISCHARGE INSTRUCTIONS
Follow up with orthopedics as was previously recommended. Take tylenol as directed for pain. Anything stronger is contraindicated with your drinking. If you change your mind about nursing home placement for short term rehab, return here or call to speak with our .     Leg or Arm Fractures  Bones can break (fracture) as a result of a fall, blow, or other trauma. Most fractures aren't life-threatening, but may be very painful and lead to serious problems if not treated properly. Getting proper treatment is crucial for a healthy recovery.  When to go to the Emergency Room (ER)    Any fracture to the leg or arm should be considered a medical emergency. The arm or leg should not be moved until help arrives. Don't attempt to straighten or adjust the bone. This can damage the bone and injure nearby blood vessels and nerves. If an open wound is present, cover it with a clean cloth to keep it from getting dirty and to help prevent infection.  What to expect in the ER  Here is what will happen in the ER:     A healthcare provider will ask about your injury and examine you carefully.    Any wounds you have will be cleaned.    X-rays of the injured area will be taken.  Treatment  Treatment depends on where the bone is broken and whether there is an open wound. If you have an open wound, you may receive IV antibiotics and have the wound flushed with sterile water. Most fractures are treated in two stages:    Reduction. The bone is put back into its proper position, if needed.    Immobilization. The bone is held in place so it doesn't move as it heals. For many arm or leg fractures, this is done with a splint or cast. Serious or compound fractures may first need surgical repair. In that case, you will be referred to a bone specialist (orthopaedic surgeon).  Signs of a leg or arm fracture  Here is what to look for:    The leg or arm is crooked    A joint looks out of place    A bone protrudes from the skin    Weight  can't be put on the leg or arm    The limb swells or is very painful    The limb is numb or tingles    A popping or snapping was heard during the injury    Bruising     [id number] [employer]. [address] [place] [address], [place] [address]. All rights reserved. This information is not intended as a substitute for professional medical care. Always follow your healthcare professional's instructions.

## 2017-05-09 ENCOUNTER — HOSPITAL ENCOUNTER (EMERGENCY)
Facility: HOSPITAL | Age: 62
Discharge: HOME | End: 2017-05-09
Attending: FAMILY MEDICINE | Admitting: FAMILY MEDICINE
Payer: COMMERCIAL

## 2017-05-09 VITALS
HEART RATE: 106 BPM | SYSTOLIC BLOOD PRESSURE: 131 MMHG | RESPIRATION RATE: 18 BRPM | TEMPERATURE: 97.7 F | OXYGEN SATURATION: 97 % | DIASTOLIC BLOOD PRESSURE: 88 MMHG

## 2017-05-09 DIAGNOSIS — F10.220 ACUTE ALCOHOLIC INTOXICATION IN ALCOHOLISM, UNCOMPLICATED (H): ICD-10-CM

## 2017-05-09 LAB
ALBUMIN SERPL-MCNC: 3.3 G/DL (ref 3.4–5)
ALP SERPL-CCNC: 112 U/L (ref 40–150)
ALT SERPL W P-5'-P-CCNC: 30 U/L (ref 0–70)
ANION GAP SERPL CALCULATED.3IONS-SCNC: 10 MMOL/L (ref 3–14)
AST SERPL W P-5'-P-CCNC: 31 U/L (ref 0–45)
BASOPHILS # BLD AUTO: 0 10E9/L (ref 0–0.2)
BASOPHILS NFR BLD AUTO: 0.4 %
BILIRUB SERPL-MCNC: 0.2 MG/DL (ref 0.2–1.3)
BUN SERPL-MCNC: 6 MG/DL (ref 7–30)
CALCIUM SERPL-MCNC: 8.3 MG/DL (ref 8.5–10.1)
CHLORIDE SERPL-SCNC: 107 MMOL/L (ref 94–109)
CO2 SERPL-SCNC: 29 MMOL/L (ref 20–32)
CREAT SERPL-MCNC: 0.65 MG/DL (ref 0.66–1.25)
DIFFERENTIAL METHOD BLD: ABNORMAL
EOSINOPHIL # BLD AUTO: 0.2 10E9/L (ref 0–0.7)
EOSINOPHIL NFR BLD AUTO: 1.7 %
ERYTHROCYTE [DISTWIDTH] IN BLOOD BY AUTOMATED COUNT: 14.6 % (ref 10–15)
ETHANOL SERPL-MCNC: 0.42 G/DL
GFR SERPL CREATININE-BSD FRML MDRD: ABNORMAL ML/MIN/1.7M2
GLUCOSE SERPL-MCNC: 101 MG/DL (ref 70–99)
HCT VFR BLD AUTO: 47.2 % (ref 40–53)
HGB BLD-MCNC: 16 G/DL (ref 13.3–17.7)
IMM GRANULOCYTES # BLD: 0 10E9/L (ref 0–0.4)
IMM GRANULOCYTES NFR BLD: 0.3 %
LYMPHOCYTES # BLD AUTO: 3.5 10E9/L (ref 0.8–5.3)
LYMPHOCYTES NFR BLD AUTO: 39.2 %
MCH RBC QN AUTO: 33.8 PG (ref 26.5–33)
MCHC RBC AUTO-ENTMCNC: 33.9 G/DL (ref 31.5–36.5)
MCV RBC AUTO: 100 FL (ref 78–100)
MONOCYTES # BLD AUTO: 0.9 10E9/L (ref 0–1.3)
MONOCYTES NFR BLD AUTO: 9.6 %
NEUTROPHILS # BLD AUTO: 4.4 10E9/L (ref 1.6–8.3)
NEUTROPHILS NFR BLD AUTO: 48.8 %
NRBC # BLD AUTO: 0 10*3/UL
NRBC BLD AUTO-RTO: 0 /100
PLATELET # BLD AUTO: 306 10E9/L (ref 150–450)
POTASSIUM SERPL-SCNC: 3.3 MMOL/L (ref 3.4–5.3)
PROT SERPL-MCNC: 7.4 G/DL (ref 6.8–8.8)
RBC # BLD AUTO: 4.74 10E12/L (ref 4.4–5.9)
SODIUM SERPL-SCNC: 146 MMOL/L (ref 133–144)
WBC # BLD AUTO: 9 10E9/L (ref 4–11)

## 2017-05-09 PROCEDURE — 99284 EMERGENCY DEPT VISIT MOD MDM: CPT | Mod: 25

## 2017-05-09 PROCEDURE — 36415 COLL VENOUS BLD VENIPUNCTURE: CPT | Performed by: FAMILY MEDICINE

## 2017-05-09 PROCEDURE — 25000128 H RX IP 250 OP 636: Performed by: FAMILY MEDICINE

## 2017-05-09 PROCEDURE — 85025 COMPLETE CBC W/AUTO DIFF WBC: CPT | Performed by: FAMILY MEDICINE

## 2017-05-09 PROCEDURE — 96361 HYDRATE IV INFUSION ADD-ON: CPT

## 2017-05-09 PROCEDURE — 96360 HYDRATION IV INFUSION INIT: CPT

## 2017-05-09 PROCEDURE — 80053 COMPREHEN METABOLIC PANEL: CPT | Performed by: FAMILY MEDICINE

## 2017-05-09 PROCEDURE — 80320 DRUG SCREEN QUANTALCOHOLS: CPT | Performed by: FAMILY MEDICINE

## 2017-05-09 PROCEDURE — 99284 EMERGENCY DEPT VISIT MOD MDM: CPT | Performed by: FAMILY MEDICINE

## 2017-05-09 RX ORDER — SODIUM CHLORIDE 9 MG/ML
1000 INJECTION, SOLUTION INTRAVENOUS CONTINUOUS
Status: DISCONTINUED | OUTPATIENT
Start: 2017-05-09 | End: 2017-05-09 | Stop reason: HOSPADM

## 2017-05-09 RX ADMIN — SODIUM CHLORIDE 1000 ML: 9 INJECTION, SOLUTION INTRAVENOUS at 11:31

## 2017-05-09 RX ADMIN — SODIUM CHLORIDE 1000 ML: 9 INJECTION, SOLUTION INTRAVENOUS at 10:08

## 2017-05-09 NOTE — ED NOTES
Assessment complete.  BP cuff and sat monitor on. Pt is disheveled and odorous.  Talking incessantly, rambling.

## 2017-05-09 NOTE — ED PROVIDER NOTES
eMERGENCY dEPARTMENT eNCOUnter        CHIEF COMPLAINT    Chief Complaint   Patient presents with     Foot Pain     left foot/ ankle pain, Fx Feb 2017, ED 5/8/17 FX still present. placed in boot. Pt states he does notlike the boot and removed it. ETOH at least a qt over night       HPI    Andrew Virgen is a 61 year old male who arrives by EMS after he was up all night drinking, took off his walking boot, and his leg hurt this morning.  He was in the ED for an extended period yesterday, was intoxicated, slept here for a while and was discharged with a cast boot.  He is staying in a small camper in the backyard of a friend, this is by choice. Yesterday the boot started to bother him so he took the boot off.  He stayed up all night drinking and called EMS this morning.  He actually was so drunk that he couldn't stand up to assist EMS, they helped him out of the camper with dfificulty.  No falls, no other injury.  He is hungry and would like something to eat.  He is quite intoxicated, insulting and demanding with nurses.    REVIEW OF SYSTEMS    Skin: No lacerations or puncture wounds  Musculoskeletal: left foot pain, no other joint or bony injury or pain  Neurologic: No loss of consciousness, no head injury, no paresthesias or focal distal extremity weakness    PAST MEDICAL & SURGICAL HISTORY    Past Medical History:   Diagnosis Date     Acute myocardial infarction of other specified sit 3/10/2011     Hypertension, benign 3/10/2011     Osteoarthrosis, unspecified whether generalized or  3/10/2011     Other and unspecified alcohol dependence, unspecif 3/10/2011     Past Surgical History:   Procedure Laterality Date     heart surgery -1 stent  3/2009     pins in foot      fractures - right       CURRENT MEDICATIONS    Current Outpatient Rx   Medication Sig Dispense Refill     folic acid (FOLVITE) 1 MG tablet Take 1 tablet (1 mg) by mouth daily 30 tablet 0     clopidogrel (PLAVIX) 75 MG tablet Take 1 tablet (75 mg) by  mouth daily 10 tablet 0     tamsulosin (FLOMAX) 0.4 MG 24 hr capsule Take 1 capsule (0.4 mg) by mouth daily 30 capsule 0     multivitamin, therapeutic with minerals (THERA-VIT-M) TABS Take 1 tablet by mouth daily 30 each 0     thiamine 100 MG tablet Take 1 tablet (100 mg) by mouth daily 30 tablet 0     magnesium oxide (MAG-OX) 400 (241.3 MG) MG tablet Take 1 tablet (400 mg) by mouth daily 7 tablet 0     order for DME Equipment being ordered: bilateral knee sleeves 2 Units 0     gabapentin (NEURONTIN) 300 MG capsule Take 1 capsule (300 mg) by mouth 4 times daily (Patient not taking: Reported on 3/8/2017) 40 capsule 0     lisinopril (PRINIVIL,ZESTRIL) 20 MG tablet Take 1 tablet (20 mg) by mouth daily 10 tablet 0     metoprolol (TOPROL-XL) 25 MG 24 hr tablet Take 1 tablet (25 mg) by mouth daily 10 tablet 0     simvastatin (ZOCOR) 5 MG tablet Take 2 tablets (10 mg) by mouth daily 30 tablet 0     nitroglycerin (NITROSTAT) 0.4 MG SL tablet Place 1 tablet (0.4 mg) under the tongue every 5 minutes as needed for chest pain At the 1st sign of attack; may repeat every 5 min until relief; if pain persists after 3 tablets in 15 min, prompt medial attention is recommended AS NEEDED 10 tablet 0       ALLERGIES    Allergies   Allergen Reactions     Cocaine Shortness Of Breath and Other (See Comments)     Respiratory distress     Codeine      From [facility] record.      Penicillins        SOCIAL & FAMILY HISTORY    Social History     Social History     Marital status: Single     Spouse name: N/A     Number of children: N/A     Years of education: N/A     Social History Main Topics     Smoking status: Current Every Day Smoker     Packs/day: 0.50     Years: 39.00     Types: Cigarettes     Smokeless tobacco: Not on file      Comment: tried to quit - longest period tobacco free: 6 months - passive smoke exposure     Alcohol use Yes      Comment: vodka     Drug use: No     Sexual activity: Not on file     Other Topics  "Concern     Blood Transfusions Yes     Caffeine Concern No     Social History Narrative     Family History   Problem Relation Age of Onset     C.A.D. Brother 36     cause of death     DIABETES Brother      DIABETES Brother      HEART DISEASE Brother 39     heart disease - cause of death     Hypertension Father      MENTAL ILLNESS Brother          PHYSICAL EXAM    VITAL SIGNS: /88  Pulse 106  Temp 97.7  F (36.5  C) (Oral)  Resp 18  SpO2 97%  Constitutional: Disheveled, alert male.  Slurred intoxicated speech.  No trauma  HENT:  Atraumatic, moist mucous membranes  NECK: normal range of motion,  supple   Respiratory:  No respiratory distress  Cardiovascular:  No JVD  Vascular: normal pulses  Musculoskeletal:  No bruising or deformity.  When I ask him to show me where it hurts, he lifts his left foot, throws it across the bed and kicks me, says \"right there! What kind of doctor are you?\"   Integument:  Well hydrated, no skin lacerations   Neurologic:  Awake alert,  slurred speech         PROCEDURES      ED COURSE & MEDICAL DECISION MAKING   See chart for medications given during emergency department course  Vitals:    05/09/17 1345 05/09/17 1400 05/09/17 1415 05/09/17 1614   BP: 104/58 100/58 99/55 131/88   Pulse:    106   Resp:    18   Temp:    97.7  F (36.5  C)   TempSrc:    Oral   SpO2:    97%         FINAL IMPRESSION    Alcohol intoxication  Medical noncompliance    PLAN  He was given a walking boot yesterday.  This fracture dates back 2 months.  He has been a no-show with orthopedics.  Certainly his main issue is his substance abuse.  He refuses treatment and detox.  He slept here and was given a meal.  Then he wanted to go.  We did report him to [facility] as a vulnerable adult, at least he warrants a home visit to assess for safety.  I don't think he is committable at this point, but also certainly it's not safe for him to continue to come to the ED to sober up.  He states he is going to talk to his doctor " "(Deya), to \"get back on the right track\"     Ree Posadas MD  05/09/17 1932    "

## 2017-05-09 NOTE — ED PROVIDER NOTES
History     Chief Complaint   Patient presents with     Foot Pain     fx left ankle 2/2017. Has pain there today. ETOH, started drinking at 0930 thru till now. (3/4 of 1.75 rick)     HPI  Andrew Virgen is a 61 year old male who presents with left ankle pain since he fractured it in February of this year. Andrew is well known to our ED with multiple visits for ETOH abuse and falls. He was a no-show for his ortho follow up. He called the ambulance for transport today. States none of his doctors want to give him anything for pain because of his drinking. He admits to drinking today and appears intoxicated.     I have reviewed the Medications, Allergies, Past Medical and Surgical History, and Social History in the Epic system.    Review of Systems   All other systems reviewed and are negative.     Past Medical History:   Past Medical History:   Diagnosis Date     Acute myocardial infarction of other specified sit 3/10/2011     Hypertension, benign 3/10/2011     Osteoarthrosis, unspecified whether generalized or  3/10/2011     Other and unspecified alcohol dependence, unspecif 3/10/2011       Past Surgical History:   Procedure Laterality Date     heart surgery -1 stent  3/2009     pins in foot      fractures - right       Social History     Social History     Marital status: Single     Spouse name: N/A     Number of children: N/A     Years of education: N/A     Occupational History     Not on file.     Social History Main Topics     Smoking status: Current Every Day Smoker     Packs/day: 0.50     Years: 39.00     Types: Cigarettes     Smokeless tobacco: Not on file      Comment: tried to quit - longest period tobacco free: 6 months - passive smoke exposure     Alcohol use Yes      Comment: vodka     Drug use: No     Sexual activity: Not on file     Other Topics Concern     Blood Transfusions Yes     Caffeine Concern No     Social History Narrative       Discharge Medication List as of 5/8/2017  5:40 PM       CONTINUE these medications which have NOT CHANGED    Details   multivitamin, therapeutic with minerals (THERA-VIT-M) TABS Take 1 tablet by mouth daily, Disp-30 each, R-0, Local Print      magnesium oxide (MAG-OX) 400 (241.3 MG) MG tablet Take 1 tablet (400 mg) by mouth daily, Disp-7 tablet, R-0, Local Print      lisinopril (PRINIVIL,ZESTRIL) 20 MG tablet Take 1 tablet (20 mg) by mouth daily, Disp-10 tablet, R-0, E-Prescribe      metoprolol (TOPROL-XL) 25 MG 24 hr tablet Take 1 tablet (25 mg) by mouth daily, Disp-10 tablet, R-0, E-Prescribe      clopidogrel (PLAVIX) 75 MG tablet Take 1 tablet (75 mg) by mouth daily, Disp-10 tablet, R-0, E-Prescribe      tamsulosin (FLOMAX) 0.4 MG 24 hr capsule Take 1 capsule (0.4 mg) by mouth daily, Disp-30 capsule, R-0, Local Print      folic acid (FOLVITE) 1 MG tablet Take 1 tablet (1 mg) by mouth daily, Disp-30 tablet, R-0, Local Print      thiamine 100 MG tablet Take 1 tablet (100 mg) by mouth daily, Disp-30 tablet, R-0, Local Print      order for DME Equipment being ordered: bilateral knee sleevesDisp-2 Units, R-0, Local Print      gabapentin (NEURONTIN) 300 MG capsule Take 1 capsule (300 mg) by mouth 4 times daily, Disp-40 capsule, R-0, E-Prescribe      simvastatin (ZOCOR) 5 MG tablet Take 2 tablets (10 mg) by mouth daily, Disp-30 tablet, R-0, E-Prescribe      nitroglycerin (NITROSTAT) 0.4 MG SL tablet Place 1 tablet (0.4 mg) under the tongue every 5 minutes as needed for chest pain At the 1st sign of attack; may repeat every 5 min until relief; if pain persists after 3 tablets in 15 min, prompt medial attention is recommended AS NEEDED, Disp-10 tablet , R-0, Fax             Allergies: Cocaine; Codeine; and Penicillins      Physical Exam   BP: 138/80  Heart Rate: 91  Temp: 98.6  F (37  C)  Resp: 16  SpO2: 99 %  Physical Exam   Constitutional: He is oriented to person, place, and time. He appears well-developed and well-nourished. No distress.   HENT:   Head: Normocephalic and  atraumatic.   Nose: Nose normal.   Mouth/Throat: Oropharynx is clear and moist. No oropharyngeal exudate.   Eyes: Conjunctivae and EOM are normal. Pupils are equal, round, and reactive to light. Right eye exhibits no discharge. Left eye exhibits no discharge. No scleral icterus.   Neck: Normal range of motion. Neck supple. No JVD present.   Cardiovascular: Normal rate, regular rhythm, normal heart sounds and intact distal pulses.  Exam reveals no gallop and no friction rub.    No murmur heard.  Pulmonary/Chest: Effort normal and breath sounds normal. No respiratory distress. He has no wheezes. He has no rales. He exhibits no tenderness.   Abdominal: There is no tenderness.   Musculoskeletal: Normal range of motion. He exhibits no edema.        Left ankle: He exhibits swelling (Mild. ). He exhibits no deformity and normal pulse. Tenderness. Lateral malleolus and medial malleolus tenderness found. Achilles tendon normal.        Left lower leg: Normal.        Left foot: Normal.   Lymphadenopathy:     He has no cervical adenopathy.   Neurological: He is alert and oriented to person, place, and time. No cranial nerve deficit. Coordination normal.   Skin: Skin is warm and dry. No rash noted. He is not diaphoretic. No erythema. No pallor.   Psychiatric: He has a normal mood and affect. His behavior is normal. Judgment and thought content normal. His speech is slurred.   Nursing note and vitals reviewed.      ED Course     ED Course     Procedures             Labs Ordered and Resulted from Time of ED Arrival Up to the Time of Departure from the ED - No data to display  Results for orders placed or performed in visit on 04/04/17   XR ANKLE LT G/E 3 VW (Clinic Performed)    Narrative    LEFT ANKLE THREE VIEWS    COMPARISON:  Today's study is compared to a prior examination which is  dated March 4, 2017.    FINDINGS:  Three views of the left ankle were obtained.  There is an  oblique fracture of the distal fibula.  There is  slight prominence of  the medial ankle mortise, not evident on a prior study.  Alignment is  otherwise anatomic.  Distal fibular fracture line is unchanged.    IMPRESSION:   OBLIQUE DISTAL LEFT FIBULA FRACTURE WITHOUT SIGNIFICANT  CHANGE.    THERE IS SLIGHT PROMINENCE OF THE MEDIAL ANKLE MORTISE, NOT EVIDENT ON  THE PRIOR STUDY.  QUESTION POSSIBILITY OF LIGAMENTOUS INJURY.  Exam Date: Apr 06, 2017 11:17:41 AM  Author: ADDIE LOPEZ  This report is final and null         Assessments & Plan (with Medical Decision Making)   Andrew presents today, intoxicated as usual, wanting something for pain for his known left ankle fracture. He has been non-compliant with wearing the walking boot and was a no-show for his ortho f/u. He was given IV tylenol for pain here. Repeat left ankle XR today shows no change, no evidence of healing. He demands discharge home at this point. See plan below.     Plan: Follow up with orthopedics as was previously recommended. Take tylenol as directed for pain. Anything stronger is contraindicated with your drinking. If you change your mind about nursing home placement for short term rehab, return here or call to speak with our .     I have reviewed the nursing notes.    I have reviewed the findings, diagnosis, plan and need for follow up with the patient.    Discharge Medication List as of 5/8/2017  5:40 PM          Final diagnoses:   Ankle fracture, left, closed, with delayed healing, subsequent encounter       5/8/2017   [facility]     Mary Anne Martinez PA-C  05/08/17 7702

## 2017-05-09 NOTE — ED NOTES
Pt returns to the ED after being discharged less than 24 hours ago from ED with a fx right foot. Was given a boot to wear but pt did not like the boot so he removed it. Came in due to continuing right foot pain. Boot not with pt. left foot swollen. Original fx was in Feb 2017 and has not healed.

## 2017-05-09 NOTE — ED NOTES
"Pt is given d/c papers and verbalizes understanding of d/c dx, follow up with PMD as needed. IV is out and site is WDL.  Pt states his foot\"hurts\" when asked if in pain.  VS as charted.  Waiting with  in w/c for his taxi.  "

## 2017-05-09 NOTE — ED AVS SNAPSHOT
[facility]    [address]    [place] [address]    Phone:  [phone]                                       Andrew Virgen   MRN: [id number]    Department:  [facility]   Date of Visit:  5/9/2017           Patient Information     Date Of Birth          1955        Your diagnoses for this visit were:     Acute alcoholic intoxication in alcoholism, uncomplicated (H)        You were seen by Ree Posadas MD.         Review of your medicines      Our records show that you are taking the medicines listed below. If these are incorrect, please call your family doctor or clinic.        Dose / Directions Last dose taken    clopidogrel 75 MG tablet   Commonly known as:  PLAVIX   Dose:  75 mg   Quantity:  10 tablet        Take 1 tablet (75 mg) by mouth daily   Refills:  0        folic acid 1 MG tablet   Commonly known as:  FOLVITE   Dose:  1 mg   Quantity:  30 tablet        Take 1 tablet (1 mg) by mouth daily   Refills:  0        gabapentin 300 MG capsule   Commonly known as:  NEURONTIN   Dose:  300 mg   Quantity:  40 capsule        Take 1 capsule (300 mg) by mouth 4 times daily   Refills:  0        lisinopril 20 MG tablet   Commonly known as:  PRINIVIL/ZESTRIL   Dose:  20 mg   Quantity:  10 tablet        Take 1 tablet (20 mg) by mouth daily   Refills:  0        magnesium oxide 400 (241.3 MG) MG tablet   Commonly known as:  MAG-OX   Dose:  400 mg   Quantity:  7 tablet        Take 1 tablet (400 mg) by mouth daily   Refills:  0        metoprolol 25 MG 24 hr tablet   Commonly known as:  TOPROL-XL   Dose:  25 mg   Quantity:  10 tablet        Take 1 tablet (25 mg) by mouth daily   Refills:  0        multivitamin, therapeutic with minerals Tabs tablet   Dose:  1 tablet   Quantity:  30 each        Take 1 tablet by mouth daily   Refills:  0        nitroglycerin 0.4 MG sublingual tablet   Commonly known as:  NITROSTAT   Dose:  0.4 mg   Quantity:  10 tablet        Place 1 tablet (0.4 mg)  "under the tongue every 5 minutes as needed for chest pain At the 1st sign of attack; may repeat every 5 min until relief; if pain persists after 3 tablets in 15 min, prompt medial attention is recommended AS NEEDED   Refills:  0        order for DME   Quantity:  2 Units        Equipment being ordered: bilateral knee sleeves   Refills:  0        simvastatin 5 MG tablet   Commonly known as:  ZOCOR   Dose:  10 mg   Quantity:  30 tablet        Take 2 tablets (10 mg) by mouth daily   Refills:  0        tamsulosin 0.4 MG capsule   Commonly known as:  FLOMAX   Dose:  0.4 mg   Quantity:  30 capsule        Take 1 capsule (0.4 mg) by mouth daily   Refills:  0        thiamine 100 MG tablet   Dose:  100 mg   Quantity:  30 tablet        Take 1 tablet (100 mg) by mouth daily   Refills:  0                Procedures and tests performed during your visit     Alcohol ethyl    CBC with platelets differential    Comprehensive metabolic panel      Orders Needing Specimen Collection     None      Pending Results     No orders found from 5/7/2017 to 5/10/2017.            Pending Culture Results     No orders found from 5/7/2017 to 5/10/2017.            Thank you for choosing [facility]       Thank you for choosing [facility] for your care. Our goal is always to provide you with excellent care. Hearing back from our patients is one way we can continue to improve our services. Please take a few minutes to complete the written survey that you may receive in the mail after you visit with us. Thank you!        [email or web address] Information     [employer] lets you send messages to your doctor, view your test results, renew your prescriptions, schedule appointments and more. To sign up, go to [email or web address] . Click on \"Log in\" on the left side of the screen, which will take you to the Welcome page. Then click on \"Sign up Now\" on the right side of the page.     You will be asked to enter the access code listed below, as well as some personal " information. Please follow the directions to create your username and password.     Your access code is: [email or web address]  Expires: 2017  5:40 PM     Your access code will  in 90 days. If you need help or a new code, please call your [facility] clinic or [phone].        Care EveryWhere ID     This is your Care EveryWhere ID. This could be used by other organizations to access your [facility] medical records  [id number]        After Visit Summary       This is your record. Keep this with you and show to your community pharmacist(s) and doctor(s) at your next visit.

## 2017-05-09 NOTE — ED AVS SNAPSHOT
[facility]    [address] [place] [address]    Phone:  [phone]                                       Andrew Virgen   MRN: [id number]    Department:  [facility]   Date of Visit:  5/9/2017           After Visit Summary Signature Page     I have received my discharge instructions, and my questions have been answered. I have discussed any challenges I see with this plan with the nurse or doctor.    ..........................................................................................................................................  Patient/Patient Representative Signature      ..........................................................................................................................................  Patient Representative Print Name and Relationship to Patient    ..................................................               ................................................  Date                                            Time    ..........................................................................................................................................  Reviewed by Signature/Title    ...................................................              ..............................................  Date                                                            Time

## 2017-05-11 ENCOUNTER — TELEPHONE (OUTPATIENT)
Dept: ORTHOPEDICS | Facility: OTHER | Age: 62
End: 2017-05-11

## 2017-05-11 DIAGNOSIS — M25.361 INSTABILITY OF RIGHT KNEE JOINT: Primary | ICD-10-CM

## 2017-05-12 NOTE — PROGRESS NOTES
This is a note from patent's stay from 5-9-17.  I did call Evaristo at the Vulnerable Adult line to inquire aout patient meeting criteria for a referral for a [facility].  Per Dr Posadas he has the capacity to make decisions which precludes him for a referral to the [facility] line.  He continues to drink and impairs his decision making abilities.

## 2017-05-31 NOTE — TELEPHONE ENCOUNTER
Patient called stating that he was suppose to get a knee brace he received one of them but [facility] was going to order him the other one. He then called and talked to Caryn and he still hasn't received his other knee brace.     Caryn can you please follow up on this. He left me a message and wasn't happy about any of this.     He can be reached at [phone]    Thank you

## 2017-06-01 ENCOUNTER — TELEPHONE (OUTPATIENT)
Dept: ORTHOPEDICS | Facility: OTHER | Age: 62
End: 2017-06-01

## 2017-06-01 NOTE — TELEPHONE ENCOUNTER
[facility]    Tel: [phone]  Fax: [phone]

## 2017-06-01 NOTE — TELEPHONE ENCOUNTER
Called patient today and LM again to call and confirm what type of brace we need and if not to come in to see Dr. Moreno for may a suggestion on a new Brace, patient is non compliant with returning phone calls so this encounter will be closed until next telephone message or writer or staff verbally speaks with patient.  Caryn Suarez LPN

## 2017-06-01 NOTE — TELEPHONE ENCOUNTER
Patient notified of signed prescription to be sent to [facility].  Caryn Suarez LPN

## 2017-06-11 ENCOUNTER — HOSPITAL ENCOUNTER (EMERGENCY)
Facility: HOSPITAL | Age: 62
Discharge: HOME | End: 2017-06-11
Attending: FAMILY MEDICINE | Admitting: FAMILY MEDICINE
Payer: COMMERCIAL

## 2017-06-11 VITALS
OXYGEN SATURATION: 96 % | HEART RATE: 82 BPM | SYSTOLIC BLOOD PRESSURE: 113 MMHG | RESPIRATION RATE: 16 BRPM | DIASTOLIC BLOOD PRESSURE: 73 MMHG | TEMPERATURE: 98 F

## 2017-06-11 DIAGNOSIS — M25.572 CHRONIC PAIN OF LEFT ANKLE: ICD-10-CM

## 2017-06-11 DIAGNOSIS — F10.10 ETOH ABUSE: ICD-10-CM

## 2017-06-11 DIAGNOSIS — G89.29 CHRONIC PAIN OF LEFT ANKLE: ICD-10-CM

## 2017-06-11 LAB
ALBUMIN SERPL-MCNC: 3.6 G/DL (ref 3.4–5)
ALP SERPL-CCNC: 109 U/L (ref 40–150)
ALT SERPL W P-5'-P-CCNC: 27 U/L (ref 0–70)
ANION GAP SERPL CALCULATED.3IONS-SCNC: 10 MMOL/L (ref 3–14)
AST SERPL W P-5'-P-CCNC: 47 U/L (ref 0–45)
BASOPHILS # BLD AUTO: 0 10E9/L (ref 0–0.2)
BASOPHILS NFR BLD AUTO: 0.4 %
BILIRUB SERPL-MCNC: 0.6 MG/DL (ref 0.2–1.3)
BUN SERPL-MCNC: 9 MG/DL (ref 7–30)
CALCIUM SERPL-MCNC: 8.3 MG/DL (ref 8.5–10.1)
CHLORIDE SERPL-SCNC: 102 MMOL/L (ref 94–109)
CO2 SERPL-SCNC: 29 MMOL/L (ref 20–32)
CREAT SERPL-MCNC: 0.51 MG/DL (ref 0.66–1.25)
DIFFERENTIAL METHOD BLD: ABNORMAL
EOSINOPHIL # BLD AUTO: 0.1 10E9/L (ref 0–0.7)
EOSINOPHIL NFR BLD AUTO: 0.8 %
ERYTHROCYTE [DISTWIDTH] IN BLOOD BY AUTOMATED COUNT: 13.9 % (ref 10–15)
ETHANOL SERPL-MCNC: 0.45 G/DL
GFR SERPL CREATININE-BSD FRML MDRD: ABNORMAL ML/MIN/1.7M2
GLUCOSE SERPL-MCNC: 118 MG/DL (ref 70–99)
HCT VFR BLD AUTO: 43.4 % (ref 40–53)
HGB BLD-MCNC: 15.2 G/DL (ref 13.3–17.7)
IMM GRANULOCYTES # BLD: 0 10E9/L (ref 0–0.4)
IMM GRANULOCYTES NFR BLD: 0.3 %
LYMPHOCYTES # BLD AUTO: 2.3 10E9/L (ref 0.8–5.3)
LYMPHOCYTES NFR BLD AUTO: 30.4 %
MAGNESIUM SERPL-MCNC: 1.8 MG/DL (ref 1.6–2.3)
MCH RBC QN AUTO: 33.6 PG (ref 26.5–33)
MCHC RBC AUTO-ENTMCNC: 35 G/DL (ref 31.5–36.5)
MCV RBC AUTO: 96 FL (ref 78–100)
MONOCYTES # BLD AUTO: 0.9 10E9/L (ref 0–1.3)
MONOCYTES NFR BLD AUTO: 11.6 %
NEUTROPHILS # BLD AUTO: 4.2 10E9/L (ref 1.6–8.3)
NEUTROPHILS NFR BLD AUTO: 56.5 %
NRBC # BLD AUTO: 0 10*3/UL
NRBC BLD AUTO-RTO: 0 /100
PLATELET # BLD AUTO: 154 10E9/L (ref 150–450)
POTASSIUM SERPL-SCNC: 3.5 MMOL/L (ref 3.4–5.3)
PROT SERPL-MCNC: 7.2 G/DL (ref 6.8–8.8)
RBC # BLD AUTO: 4.53 10E12/L (ref 4.4–5.9)
SODIUM SERPL-SCNC: 141 MMOL/L (ref 133–144)
WBC # BLD AUTO: 7.5 10E9/L (ref 4–11)

## 2017-06-11 PROCEDURE — 25000128 H RX IP 250 OP 636: Performed by: FAMILY MEDICINE

## 2017-06-11 PROCEDURE — 73600 X-RAY EXAM OF ANKLE: CPT | Mod: TC,LT

## 2017-06-11 PROCEDURE — 36415 COLL VENOUS BLD VENIPUNCTURE: CPT | Performed by: FAMILY MEDICINE

## 2017-06-11 PROCEDURE — 99284 EMERGENCY DEPT VISIT MOD MDM: CPT | Performed by: FAMILY MEDICINE

## 2017-06-11 PROCEDURE — 83735 ASSAY OF MAGNESIUM: CPT | Performed by: FAMILY MEDICINE

## 2017-06-11 PROCEDURE — 80320 DRUG SCREEN QUANTALCOHOLS: CPT | Performed by: FAMILY MEDICINE

## 2017-06-11 PROCEDURE — 96360 HYDRATION IV INFUSION INIT: CPT

## 2017-06-11 PROCEDURE — 85025 COMPLETE CBC W/AUTO DIFF WBC: CPT | Performed by: FAMILY MEDICINE

## 2017-06-11 PROCEDURE — 96361 HYDRATE IV INFUSION ADD-ON: CPT

## 2017-06-11 PROCEDURE — 80053 COMPREHEN METABOLIC PANEL: CPT | Performed by: FAMILY MEDICINE

## 2017-06-11 PROCEDURE — 99284 EMERGENCY DEPT VISIT MOD MDM: CPT | Mod: 25

## 2017-06-11 RX ORDER — SODIUM CHLORIDE 9 MG/ML
1000 INJECTION, SOLUTION INTRAVENOUS CONTINUOUS
Status: DISCONTINUED | OUTPATIENT
Start: 2017-06-11 | End: 2017-06-11 | Stop reason: HOSPADM

## 2017-06-11 RX ADMIN — SODIUM CHLORIDE 1000 ML: 9 INJECTION, SOLUTION INTRAVENOUS at 08:45

## 2017-06-11 RX ADMIN — SODIUM CHLORIDE 1000 ML: 9 INJECTION, SOLUTION INTRAVENOUS at 10:43

## 2017-06-11 ASSESSMENT — ENCOUNTER SYMPTOMS
ABDOMINAL PAIN: 0
COUGH: 1
PSYCHIATRIC NEGATIVE: 1
SHORTNESS OF BREATH: 0
DYSURIA: 0
FEVER: 0
ARTHRALGIAS: 1
NAUSEA: 0
SPEECH DIFFICULTY: 1
CONSTIPATION: 0
VOMITING: 0
DIARRHEA: 0
ACTIVITY CHANGE: 0

## 2017-06-11 NOTE — ED PROVIDER NOTES
History     Chief Complaint   Patient presents with     Ankle Pain     L ankle broken, cut off his cast with a saw     Alcohol Intoxication     drank 1/2 L of vodka over night     HPI  Andrew Virgen is a 61 year old male who is highly intoxicated, which is not unusual for him.  He came to the ED via EMS for unclear reasons - he is complaining of ankle pain and has a healing fracture that was casted, he cut the cast off with a sawzall and is having pain with walking.  He is wet, disheveled and has extremely poor hygiene.      I have reviewed the Medications, Allergies, Past Medical and Surgical History, and Social History in the Epic system.    Allergies:   Allergies   Allergen Reactions     Cocaine Shortness Of Breath and Other (See Comments)     Respiratory distress     Codeine      From [facility] record.      Penicillins          No current facility-administered medications on file prior to encounter.   Current Outpatient Prescriptions on File Prior to Encounter:  order for DME Patella Stabilizing Brace Right Knee   tamsulosin (FLOMAX) 0.4 MG 24 hr capsule Take 1 capsule (0.4 mg) by mouth daily   folic acid (FOLVITE) 1 MG tablet Take 1 tablet (1 mg) by mouth daily   multivitamin, therapeutic with minerals (THERA-VIT-M) TABS Take 1 tablet by mouth daily   thiamine 100 MG tablet Take 1 tablet (100 mg) by mouth daily   magnesium oxide (MAG-OX) 400 (241.3 MG) MG tablet Take 1 tablet (400 mg) by mouth daily   order for DME Equipment being ordered: bilateral knee sleeves   gabapentin (NEURONTIN) 300 MG capsule Take 1 capsule (300 mg) by mouth 4 times daily (Patient not taking: Reported on 3/8/2017)   lisinopril (PRINIVIL,ZESTRIL) 20 MG tablet Take 1 tablet (20 mg) by mouth daily   metoprolol (TOPROL-XL) 25 MG 24 hr tablet Take 1 tablet (25 mg) by mouth daily   clopidogrel (PLAVIX) 75 MG tablet Take 1 tablet (75 mg) by mouth daily   simvastatin (ZOCOR) 5 MG tablet Take 2 tablets (10 mg) by mouth  "daily   nitroglycerin (NITROSTAT) 0.4 MG SL tablet Place 1 tablet (0.4 mg) under the tongue every 5 minutes as needed for chest pain At the 1st sign of attack; may repeat every 5 min until relief; if pain persists after 3 tablets in 15 min, prompt medial attention is recommended AS NEEDED       Patient Active Problem List   Diagnosis     Acute alcohol intoxication, uncomplicated (H)     Chest pain of uncertain etiology     Acidosis - suspect alcoholic ketoacidosis     Hypokalemia     Hypomagnesemia     ETOH abuse     Possible - Urinary tract infection      Alcoholic ketoacidosis       Past Surgical History:   Procedure Laterality Date     heart surgery -1 stent  3/2009     pins in foot      fractures - right       Social History   Substance Use Topics     Smoking status: Current Every Day Smoker     Packs/day: 0.50     Years: 39.00     Types: Cigarettes     Smokeless tobacco: Not on file      Comment: tried to quit - longest period tobacco free: 6 months - passive smoke exposure     Alcohol use Yes      Comment: vodka 1/2 L daily       Most Recent Immunizations   Administered Date(s) Administered     Influenza Vaccine IM 3yrs+ 4 Valent IIV4 09/22/2016     Pneumococcal 23 valent 09/21/2016     TDAP Vaccine (Boostrix) 07/06/2013       BMI: Estimated body mass index is 24.39 kg/(m^2) as calculated from the following:    Height as of 4/6/17: 1.778 m (5' 10\").    Weight as of 4/6/17: 77.1 kg (170 lb).      Review of Systems   Constitutional: Negative for activity change and fever.   HENT: Negative.    Respiratory: Positive for cough. Negative for shortness of breath.    Cardiovascular: Negative for chest pain.   Gastrointestinal: Negative for abdominal pain, constipation, diarrhea, nausea and vomiting.   Genitourinary: Negative for dysuria.        Incontinent of urine on clothing.   Musculoskeletal: Positive for arthralgias.        See HPI.   Skin: Negative.    Neurological: Positive for speech difficulty.        " Intoxicated.   Psychiatric/Behavioral: Negative.         Normal for this patient.       Physical Exam      Physical Exam   Constitutional: He is oriented to person, place, and time. He appears well-developed and well-nourished. No distress.   HENT:   Head: Normocephalic and atraumatic.   Neck: Normal range of motion. Neck supple.   Cardiovascular: Normal rate, regular rhythm, normal heart sounds and intact distal pulses.    No murmur heard.  Pulmonary/Chest: Effort normal and breath sounds normal. No respiratory distress.   Abdominal: Soft. Bowel sounds are normal. There is no tenderness.   Musculoskeletal: Normal range of motion.   Neurological: He is alert and oriented to person, place, and time.   Skin: Skin is warm and dry.   Psychiatric: His affect is labile. Cognition and memory are impaired. He expresses impulsivity and inappropriate judgment.   Impaired due to alcohol.   Nursing note and vitals reviewed.      ED Course     ED Course     Procedures        Labs Ordered and Resulted from Time of ED Arrival Up to the Time of Departure from the ED   CBC WITH PLATELETS DIFFERENTIAL - Abnormal; Notable for the following:        Result Value    MCH 33.6 (*)     All other components within normal limits   COMPREHENSIVE METABOLIC PANEL - Abnormal; Notable for the following:     Glucose 118 (*)     Creatinine 0.51 (*)     Calcium 8.3 (*)     AST 47 (*)     All other components within normal limits   ALCOHOL ETHYL - Abnormal; Notable for the following:     Ethanol g/dL 0.45 (*)     All other components within normal limits   MAGNESIUM   VITAL SIGNS   PULSE OXIMETRY NURSING       Assessments & Plan (with Medical Decision Making)   Patient slept for a time, labs done, no change from previous.  Patient is completely noncompliant with any immobilization of his ankle, so will not attempt to do it again.  Will wrap with ACE bandage if he allows, but otherwise no treatment, and no pain medication.  He can take ibuprofen if he  chooses to do so.  Follow up with Dr. Falk as needed.    I have reviewed the nursing notes.    I have reviewed the findings, diagnosis, plan and need for follow up with the patient.       Current Discharge Medication List          Final diagnoses:   ETOH abuse   Chronic pain of left ankle - post fracture and noncompliance with immobilization.       6/11/2017   [facility]     Yanna Watson MD  06/11/17 [id number]

## 2017-06-11 NOTE — ED NOTES
Face to face report given with opportunity to observe patient.    Report given to TRACI Baxter   6/11/2017  11:07 AM

## 2017-06-11 NOTE — ED AVS SNAPSHOT
[facility]    [address] [place] [address]    Phone:  [phone]                                       Andrew Virgen   MRN: [id number]    Department[facility]  [facility]   Date of Visit:  6/11/2017           After Visit Summary Signature Page     I have received my discharge instructions, and my questions have been answered. I have discussed any challenges I see with this plan with the nurse or doctor.    ..........................................................................................................................................  Patient/Patient Representative Signature      ..........................................................................................................................................  Patient Representative Print Name and Relationship to Patient    ..................................................               ................................................  Date                                            Time    ..........................................................................................................................................  Reviewed by Signature/Title    ...................................................              ..............................................  Date                                                            Time

## 2017-06-11 NOTE — DISCHARGE INSTRUCTIONS
Ace Wrap  Minor muscle or joint injuries are often treated with an elastic bandage. The bandage provides support and compression to the injured area. An elastic bandage is a stretchy, rolled bandage. Elastic bandages range in width from 2 to 6 inches. They can be used for a variety of injuries. The bandages are often called  ACE  bandages, after the most common brand name.  If used correctly, elastic bandages help control swelling and ease pain. An elastic bandage is also a good reminder not to overuse the injured area. However, elastic bandages do not provide a lot of support and will not prevent reinjury.  Home care  To apply an elastic bandage:    Check the skin before wrapping the injury. It should be clean, dry, and free of drainage.    Start wrapping below the injury and work your way toward the body. For an ankle sprain, start wrapping around the foot and work up toward the calf. This will help control swelling.    Overlap the edges of the bandage so it stays snuggly in place.    Wrap the bandage firmly, but not too tightly. A tight bandage can increase swelling on either end of the bandage. Make sure the bandage is wrinkle free.    Leave fingers and toes exposed.    Secure ends of the bandage (even self-sticking ones) with clips or tape.    Check frequently to ensure adequate circulation, especially in the fingers and toes. Loosen the bandage if there is local swelling, numbness, tingling, discomfort, coldness, or discoloration (skin pale or bluish in color).    Rewrap the bandage as needed during the day. Reroll the bandage as you unwind it.  Continue using the elastic bandage until the pain and swelling are gone or as your healthcare provider advises.  If you have been told to ice the area, the ice can be secured in place with the elastic bandage. Wrap the ice pack with a thin towel to protect the skin. Do not put ice or an ice pack directly on the skin.  Ice the area for no more than 20 minutes at a  time.    Follow-up care  Follow up with your healthcare provider, as advised.  When to seek medical advice  Call your healthcare provider for any of the following:    Pain and swelling that doesn't get better or gets worse    Trouble moving injured area    Skin discoloration, numbness, or tingling that doesn t go away after bandage is removed    8707-0179 [employer]. [address] [place] [id number]. All rights reserved. This information is not intended as a substitute for professional medical care. Always follow your healthcare professional's instructions.

## 2017-06-11 NOTE — ED NOTES
Pt initially refused to have foot ace wrapped, now has decided to have it wrapped.  Pt assisted into paper scrubs and then into wheelchair.  Vitals wnl at discharge.

## 2017-06-11 NOTE — ED AVS SNAPSHOT
[facility]    [address]    [place] [address]    Phone:  [phone]                                       Andrew Virgen   MRN: [id number]    Department:  [facility]   Date of Visit:  6/11/2017           Patient Information     Date Of Birth          1955        Your diagnoses for this visit were:     ETOH abuse     Chronic pain of left ankle post fracture and noncompliance with immobilization.       You were seen by Yanna Watson MD.      Follow-up Information     Follow up with Tin Falk DO.    Specialty:  Internal Medicine    Why:  As needed    Contact information:    [facility]  [address] [place] [address]  [phone]          Discharge Instructions         Ace Wrap  Minor muscle or joint injuries are often treated with an elastic bandage. The bandage provides support and compression to the injured area. An elastic bandage is a stretchy, rolled bandage. Elastic bandages range in width from 2 to 6 inches. They can be used for a variety of injuries. The bandages are often called  ACE  bandages, after the most common brand name.  If used correctly, elastic bandages help control swelling and ease pain. An elastic bandage is also a good reminder not to overuse the injured area. However, elastic bandages do not provide a lot of support and will not prevent reinjury.  Home care  To apply an elastic bandage:    Check the skin before wrapping the injury. It should be clean, dry, and free of drainage.    Start wrapping below the injury and work your way toward the body. For an ankle sprain, start wrapping around the foot and work up toward the calf. This will help control swelling.    Overlap the edges of the bandage so it stays snuggly in place.    Wrap the bandage firmly, but not too tightly. A tight bandage can increase swelling on either end of the bandage. Make sure the bandage is wrinkle free.    Leave fingers and toes  exposed.    Secure ends of the bandage (even self-sticking ones) with clips or tape.    Check frequently to ensure adequate circulation, especially in the fingers and toes. Loosen the bandage if there is local swelling, numbness, tingling, discomfort, coldness, or discoloration (skin pale or bluish in color).    Rewrap the bandage as needed during the day. Reroll the bandage as you unwind it.  Continue using the elastic bandage until the pain and swelling are gone or as your healthcare provider advises.  If you have been told to ice the area, the ice can be secured in place with the elastic bandage. Wrap the ice pack with a thin towel to protect the skin. Do not put ice or an ice pack directly on the skin.  Ice the area for no more than 20 minutes at a time.    Follow-up care  Follow up with your healthcare provider, as advised.  When to seek medical advice  Call your healthcare provider for any of the following:    Pain and swelling that doesn't get better or gets worse    Trouble moving injured area    Skin discoloration, numbness, or tingling that doesn t go away after bandage is removed    4066-3064 [employer]. [address] [place] [address]. All rights reserved. This information is not intended as a substitute for professional medical care. Always follow your healthcare professional's instructions.             Review of your medicines      Our records show that you are taking the medicines listed below. If these are incorrect, please call your family doctor or clinic.        Dose / Directions Last dose taken    clopidogrel 75 MG tablet   Commonly known as:  PLAVIX   Dose:  75 mg   Quantity:  10 tablet        Take 1 tablet (75 mg) by mouth daily   Refills:  0        folic acid 1 MG tablet   Commonly known as:  FOLVITE   Dose:  1 mg   Quantity:  30 tablet        Take 1 tablet (1 mg) by mouth daily   Refills:  0        gabapentin 300 MG capsule   Commonly known as:  NEURONTIN   Dose:  300  mg   Quantity:  40 capsule        Take 1 capsule (300 mg) by mouth 4 times daily   Refills:  0        lisinopril 20 MG tablet   Commonly known as:  PRINIVIL/ZESTRIL   Dose:  20 mg   Quantity:  10 tablet        Take 1 tablet (20 mg) by mouth daily   Refills:  0        magnesium oxide 400 (241.3 MG) MG tablet   Commonly known as:  MAG-OX   Dose:  400 mg   Quantity:  7 tablet        Take 1 tablet (400 mg) by mouth daily   Refills:  0        metoprolol 25 MG 24 hr tablet   Commonly known as:  TOPROL-XL   Dose:  25 mg   Quantity:  10 tablet        Take 1 tablet (25 mg) by mouth daily   Refills:  0        multivitamin, therapeutic with minerals Tabs tablet   Dose:  1 tablet   Quantity:  30 each        Take 1 tablet by mouth daily   Refills:  0        nitroglycerin 0.4 MG sublingual tablet   Commonly known as:  NITROSTAT   Dose:  0.4 mg   Quantity:  10 tablet        Place 1 tablet (0.4 mg) under the tongue every 5 minutes as needed for chest pain At the 1st sign of attack; may repeat every 5 min until relief; if pain persists after 3 tablets in 15 min, prompt medial attention is recommended AS NEEDED   Refills:  0        * order for DME   Quantity:  2 Units        Equipment being ordered: bilateral knee sleeves   Refills:  0        * order for DME   Quantity:  1 Device        Patella Stabilizing Brace Right Knee   Refills:  0        simvastatin 5 MG tablet   Commonly known as:  ZOCOR   Dose:  10 mg   Quantity:  30 tablet        Take 2 tablets (10 mg) by mouth daily   Refills:  0        tamsulosin 0.4 MG capsule   Commonly known as:  FLOMAX   Dose:  0.4 mg   Quantity:  30 capsule        Take 1 capsule (0.4 mg) by mouth daily   Refills:  0        thiamine 100 MG tablet   Dose:  100 mg   Quantity:  30 tablet        Take 1 tablet (100 mg) by mouth daily   Refills:  0        * Notice:  This list has 2 medication(s) that are the same as other medications prescribed for you. Read the directions carefully, and ask your doctor or  "other care provider to review them with you.            Procedures and tests performed during your visit     Alcohol ethyl    CBC with platelets differential    Comprehensive metabolic panel    Magnesium    Pulse oximetry nursing    Vital signs    XR Ankle Left 2 Views      Orders Needing Specimen Collection     None      Pending Results     Date and Time Order Name Status Description    2017 0824 XR Ankle Left 2 Views In process             Pending Culture Results     No orders found from 2017 to 2017.            Thank you for choosing [facility]       Thank you for choosing [facility] for your care. Our goal is always to provide you with excellent care. Hearing back from our patients is one way we can continue to improve our services. Please take a few minutes to complete the written survey that you may receive in the mail after you visit with us. Thank you!        [employer] Information     [employer] lets you send messages to your doctor, view your test results, renew your prescriptions, schedule appointments and more. To sign up, go to [email or web address] . Click on \"Log in\" on the left side of the screen, which will take you to the Welcome page. Then click on \"Sign up Now\" on the right side of the page.     You will be asked to enter the access code listed below, as well as some personal information. Please follow the directions to create your username and password.     Your access code is: [email or web address]  Expires: 2017  5:40 PM     Your access code will  in 90 days. If you need help or a new code, please call your [facility] clinic or [phone].        Care EveryWhere ID     This is your Care EveryWhere ID. This could be used by other organizations to access your [facility] medical records  [id number]        After Visit Summary       This is your record. Keep this with you and show to your community pharmacist(s) and doctor(s) at your next visit.                  "

## 2017-06-11 NOTE — ED NOTES
Patient's SpO2 decreased to 80% while sleeping. Patient placed on 2L via NC - increased to 96% quickly.

## 2017-06-11 NOTE — ED NOTES
"62 y/o male presents via [facility] with c/o L ankle pain. 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  "

## 2017-09-30 ENCOUNTER — HOSPITAL ENCOUNTER (EMERGENCY)
Facility: HOSPITAL | Age: 62
Discharge: HOME | End: 2017-10-01
Attending: INTERNAL MEDICINE | Admitting: INTERNAL MEDICINE
Payer: COMMERCIAL

## 2017-09-30 DIAGNOSIS — F10.920 ALCOHOLIC INTOXICATION WITHOUT COMPLICATION (H): ICD-10-CM

## 2017-09-30 LAB
ALBUMIN SERPL-MCNC: 3.7 G/DL (ref 3.4–5)
ALP SERPL-CCNC: 127 U/L (ref 40–150)
ALT SERPL W P-5'-P-CCNC: 46 U/L (ref 0–70)
ANION GAP SERPL CALCULATED.3IONS-SCNC: 14 MMOL/L (ref 3–14)
AST SERPL W P-5'-P-CCNC: 34 U/L (ref 0–45)
BASOPHILS # BLD AUTO: 0 10E9/L (ref 0–0.2)
BASOPHILS NFR BLD AUTO: 0.2 %
BILIRUB SERPL-MCNC: 1 MG/DL (ref 0.2–1.3)
BUN SERPL-MCNC: 7 MG/DL (ref 7–30)
CALCIUM SERPL-MCNC: 8.3 MG/DL (ref 8.5–10.1)
CHLORIDE SERPL-SCNC: 101 MMOL/L (ref 94–109)
CO2 SERPL-SCNC: 22 MMOL/L (ref 20–32)
CREAT SERPL-MCNC: 0.53 MG/DL (ref 0.66–1.25)
DIFFERENTIAL METHOD BLD: ABNORMAL
EOSINOPHIL # BLD AUTO: 0 10E9/L (ref 0–0.7)
EOSINOPHIL NFR BLD AUTO: 0.1 %
ERYTHROCYTE [DISTWIDTH] IN BLOOD BY AUTOMATED COUNT: 15.7 % (ref 10–15)
ETHANOL SERPL-MCNC: 0.43 G/DL
GFR SERPL CREATININE-BSD FRML MDRD: >90 ML/MIN/1.7M2
GLUCOSE SERPL-MCNC: 125 MG/DL (ref 70–99)
HCT VFR BLD AUTO: 45 % (ref 40–53)
HGB BLD-MCNC: 15.9 G/DL (ref 13.3–17.7)
IMM GRANULOCYTES # BLD: 0.1 10E9/L (ref 0–0.4)
IMM GRANULOCYTES NFR BLD: 0.4 %
LYMPHOCYTES # BLD AUTO: 3.4 10E9/L (ref 0.8–5.3)
LYMPHOCYTES NFR BLD AUTO: 19.5 %
MCH RBC QN AUTO: 34.6 PG (ref 26.5–33)
MCHC RBC AUTO-ENTMCNC: 35.3 G/DL (ref 31.5–36.5)
MCV RBC AUTO: 98 FL (ref 78–100)
MONOCYTES # BLD AUTO: 1.5 10E9/L (ref 0–1.3)
MONOCYTES NFR BLD AUTO: 8.8 %
NEUTROPHILS # BLD AUTO: 12.2 10E9/L (ref 1.6–8.3)
NEUTROPHILS NFR BLD AUTO: 71 %
NRBC # BLD AUTO: 0 10*3/UL
NRBC BLD AUTO-RTO: 0 /100
PLATELET # BLD AUTO: 259 10E9/L (ref 150–450)
POTASSIUM SERPL-SCNC: 3.9 MMOL/L (ref 3.4–5.3)
PROT SERPL-MCNC: 7.8 G/DL (ref 6.8–8.8)
RBC # BLD AUTO: 4.6 10E12/L (ref 4.4–5.9)
SODIUM SERPL-SCNC: 137 MMOL/L (ref 133–144)
WBC # BLD AUTO: 17.1 10E9/L (ref 4–11)

## 2017-09-30 PROCEDURE — 36415 COLL VENOUS BLD VENIPUNCTURE: CPT | Performed by: INTERNAL MEDICINE

## 2017-09-30 PROCEDURE — 80053 COMPREHEN METABOLIC PANEL: CPT | Performed by: INTERNAL MEDICINE

## 2017-09-30 PROCEDURE — 85025 COMPLETE CBC W/AUTO DIFF WBC: CPT | Performed by: INTERNAL MEDICINE

## 2017-09-30 PROCEDURE — 99283 EMERGENCY DEPT VISIT LOW MDM: CPT | Performed by: INTERNAL MEDICINE

## 2017-09-30 PROCEDURE — 80320 DRUG SCREEN QUANTALCOHOLS: CPT | Performed by: INTERNAL MEDICINE

## 2017-09-30 PROCEDURE — 25000128 H RX IP 250 OP 636: Performed by: INTERNAL MEDICINE

## 2017-09-30 PROCEDURE — 99283 EMERGENCY DEPT VISIT LOW MDM: CPT

## 2017-09-30 RX ORDER — SODIUM CHLORIDE 9 MG/ML
INJECTION, SOLUTION INTRAVENOUS CONTINUOUS
Status: DISCONTINUED | OUTPATIENT
Start: 2017-09-30 | End: 2017-10-01 | Stop reason: HOSPADM

## 2017-09-30 RX ADMIN — SODIUM CHLORIDE: 9 INJECTION, SOLUTION INTRAVENOUS at 21:28

## 2017-09-30 NOTE — ED AVS SNAPSHOT
[facility]    [address]    [place] [address]    Phone:  [phone]                                       Andrew Virgen   MRN: [id number]    Department[facility]   Date of Visit:  9/30/2017           Patient Information     Date Of Birth          1955        Your diagnoses for this visit were:     Alcoholic intoxication without complication (H)        You were seen by Kaleb Martin MD.      Follow-up Information     Schedule an appointment as soon as possible for a visit with Tin Falk DO.    Specialty:  Internal Medicine    Contact information:    [facility]  [address] [place] [address] [phone]          Discharge Instructions         Alcohol Intoxication  Alcohol intoxication occurs when you drink alcohol faster than your liver can remove it from your system. The following facts are important to remember:    It can take 10 minutes or more to start to feel the effects of a drink, so you can easily get more intoxicated than you intended.    One drink may be more than 1 serving of alcohol. Depending on the drink, it can be 2 to 4 servings.    It takes about an hour for your body to metabolize (clear) 1 serving. If you have more than 1 drink, it can take a couple of hours or more.    Many things affect how drinks will affect you, including whether you ve eaten, how fast you drink, your size, how much you normally drink (or not), medicines you take, chronic diseases you have, and gender.  Signs and symptoms of alcohol poisoning  The following are signs and symptoms of alcohol poisoning:  Mild impairment    Reduced inhibitions    Slurred speech    Drowsiness    Decreased fine motor skills  Moderate impairment    Erratic behavior, aggression, depression    Impaired judgment    Confusion    Concentration difficulties    Coordination problems  Severe impairment    Vomiting    Seizures    Unconsciousness    Cold, clammy    Slow or  "irregular breathing    Hypothermia (low body temperature)    Coma  Health effects  Alcohol abuse causes health problems. Sometimes this can happen after only drinking a  little.\" There is no set number of drinks or amount of alcohol that defines too much. The more you drink at one time, and the more frequently you drink determine both the short-term and long-term health effects. It affects all parts of your body and your health, including your:    Brain. Alcohol is a central nervous system depressant. It can damage parts of the brain that affect your balance, memory, thinking, and emotions. It can cause memory loss, blackouts, depression, agitation, sleep cycle changes, and seizures. These changes may or may not be reversible.    Heart and vascular system. Alcohol affects multiple areas. It can damage heart muscle causing cardiomyopathy, which is a weakening and stretching of the heart muscle. This can lead to trouble breathing, an irregular heartbeat, atrial fibrillation, leg swelling, and heart failure. It makes the blood vessels stiffen causing hypertension (high blood pressure). All of these problems increase your risk of having heart attacks or strokes.    Liver. Alcohol causes fat to build up in the liver, affecting its normal function. This increases the risk for hepatitis, leading to abdominal pain, appetite loss, jaundice, bleeding problems, liver fibrosis, and cirrhosis. This in turn can affect your ability to fight off infections, and can cause diabetes. The liver changes prevent it from removing toxins in your blood that can cause encephalopathy. Signs of this are confusion, altered level of consciousness, personality changes, memory loss, seizures, coma, and death.    Pancreas. Alcohol can cause inflammation of the pancreas, or pancreatitis. This can cause pain in your abdomen, fever, and diabetes.    Immune system. Alcohol weakens your immune system in a number of ways. It suppresses your immune system " making it harder to fight off infections and colds. You will also have a higher risk of certain infections like pneumonia and tuberculosis.    Cancer risk. Alcohol raises your risk of cancer of the mouth, esophagus, pharynx, larynx, liver, and breast.    Sexual function. Alcohol abuse can also lead to sexual problems.  Alcohol use during pregnancy may cause permanent damage to the growing baby.  Home care  The following guidelines will help you care for yourself at home:    Don't drink any more alcohol.    Don't drive until all effects of the alcohol have worn off.    Don't operate machinery that can cause injuries.    Get lots of rest over the next few days. Drink plenty of water and other non-alcoholic liquids. Try to eat regular meals.    If you have been drinking heavily on a daily basis, you may go through alcohol withdrawal. The usual symptoms last 3 to 4 days and may include nervousness, shakiness, nausea, sweating, sleeplessness, and can even cause seizures and a serious withdrawal symptom called delirium tremens, or DTs. During this time, it is best that you stay with family or friends who can help and support you. You can also admit yourself to a residential detox program. If your symptoms are severe (seizures, severe shakiness, confusion), contact your doctor or call an ambulance for help (see below).   Follow-up care  If alcohol is a problem in your life, these are some organizations that can help you:    Alcoholics Anonymous offers support through a self-help fellowship. There are no dues or fees. See the Yellow Pages and call for time and place of meetings. Find AA online at www.aa.org.    Med offers support to families of alcohol users. Contact 864-150-6452, or online at www.al-yazan.org.    National [employer] on Alcoholism and Drug Dependence can be reached at 246-755-0010, or online at www.ncadd.org.    There are also inpatient and residential alcohol detox programs. Check the Internet or phonebook  Yellow Pages under  Drug Abuse and Treatment Centers.   Call 911  Call 911 if any of these occur:    Trouble breathing or slow irregular breathing    Chest pain    Sudden weakness on one side of your body or sudden trouble speaking    Heavy bleeding or vomiting blood    Very drowsy or trouble awakening    Fainting or loss of consciousness    Rapid heart rate    Seizure  When to seek medical advice  Call your healthcare provider right away if any of these occur:    Severe shakiness     Fever over 100.4  F (38.0  C)    Confusion or hallucinations (seeing, hearing, or feeling things that are not there)    Pain in your upper abdomen that gets worse    Repeated vomiting  Date Last Reviewed: 6/1/2016 2000-2017 [employer]. [address] [place] [address], [place] [address]. All rights reserved. This information is not intended as a substitute for professional medical care. Always follow your healthcare professional's instructions.             Review of your medicines      Our records show that you are taking the medicines listed below. If these are incorrect, please call your family doctor or clinic.        Dose / Directions Last dose taken    clopidogrel 75 MG tablet   Commonly known as:  PLAVIX   Dose:  75 mg   Quantity:  10 tablet        Take 1 tablet (75 mg) by mouth daily   Refills:  0        folic acid 1 MG tablet   Commonly known as:  FOLVITE   Dose:  1 mg   Quantity:  30 tablet        Take 1 tablet (1 mg) by mouth daily   Refills:  0        gabapentin 300 MG capsule   Commonly known as:  NEURONTIN   Dose:  300 mg   Quantity:  40 capsule        Take 1 capsule (300 mg) by mouth 4 times daily   Refills:  0        lisinopril 20 MG tablet   Commonly known as:  PRINIVIL/ZESTRIL   Dose:  20 mg   Quantity:  10 tablet        Take 1 tablet (20 mg) by mouth daily   Refills:  0        magnesium oxide 400 (241.3 MG) MG tablet   Commonly known as:  MAG-OX   Dose:  400 mg   Quantity:  7 tablet        Take 1 tablet (400  mg) by mouth daily   Refills:  0        metoprolol 25 MG 24 hr tablet   Commonly known as:  TOPROL-XL   Dose:  25 mg   Quantity:  10 tablet        Take 1 tablet (25 mg) by mouth daily   Refills:  0        multivitamin, therapeutic with minerals Tabs tablet   Dose:  1 tablet   Quantity:  30 each        Take 1 tablet by mouth daily   Refills:  0        nitroGLYcerin 0.4 MG sublingual tablet   Commonly known as:  NITROSTAT   Dose:  0.4 mg   Quantity:  10 tablet        Place 1 tablet (0.4 mg) under the tongue every 5 minutes as needed for chest pain At the 1st sign of attack; may repeat every 5 min until relief; if pain persists after 3 tablets in 15 min, prompt medial attention is recommended AS NEEDED   Refills:  0        * order for DME   Quantity:  2 Units        Equipment being ordered: bilateral knee sleeves   Refills:  0        * order for DME   Quantity:  1 Device        Patella Stabilizing Brace Right Knee   Refills:  0        simvastatin 5 MG tablet   Commonly known as:  ZOCOR   Dose:  10 mg   Quantity:  30 tablet        Take 2 tablets (10 mg) by mouth daily   Refills:  0        tamsulosin 0.4 MG capsule   Commonly known as:  FLOMAX   Dose:  0.4 mg   Quantity:  30 capsule        Take 1 capsule (0.4 mg) by mouth daily   Refills:  0        thiamine 100 MG tablet   Dose:  100 mg   Quantity:  30 tablet        Take 1 tablet (100 mg) by mouth daily   Refills:  0        * Notice:  This list has 2 medication(s) that are the same as other medications prescribed for you. Read the directions carefully, and ask your doctor or other care provider to review them with you.            Procedures and tests performed during your visit     Alcohol ethyl    CBC with platelets differential    Comprehensive metabolic panel      Orders Needing Specimen Collection     None      Pending Results     No orders found for last 3 day(s).            Pending Culture Results     No orders found for last 3 day(s).            Thank you for  "choosing [facility]       Thank you for choosing [facility] for your care. Our goal is always to provide you with excellent care. Hearing back from our patients is one way we can continue to improve our services. Please take a few minutes to complete the written survey that you may receive in the mail after you visit with us. Thank you!        [employer] Information     [email or web address] lets you send messages to your doctor, view your test results, renew your prescriptions, schedule appointments and more. To sign up, go to [email or web address] . Click on \"Log in\" on the left side of the screen, which will take you to the Welcome page. Then click on \"Sign up Now\" on the right side of the page.     You will be asked to enter the access code listed below, as well as some personal information. Please follow the directions to create your username and password.     Your access code is: [email or web address]  Expires: 2017  7:47 AM     Your access code will  in 90 days. If you need help or a new code, please call your [facility] clinic or [phone].        Care EveryWhere ID     This is your Care EveryWhere ID. This could be used by other organizations to access your [facility] medical records  [id number]        Equal Access to Services     SHAYAN MERCEDES : Sara Heredia, wamohitda felice, qaybta kaalmada nanda, laurel angulo. So [facility] [phone].    ATENCIÓN: Si habla español, tiene a bashir disposición servicios gratuitos de asistencia lingüística. Llame al [phone].    We comply with applicable federal civil rights laws and [place] laws. We do not discriminate on the basis of race, color, national origin, age, disability, sex, sexual orientation, or gender identity.            After Visit Summary       This is your record. Keep this with you and show to your community pharmacist(s) and doctor(s) at your next visit.                  "

## 2017-09-30 NOTE — ED AVS SNAPSHOT
[facility]    [address] [place] [address]    Phone:  [phone]                                       Andrew Virgen   MRN: [id number]    [facility]   Date of Visit:  9/30/2017           After Visit Summary Signature Page     I have received my discharge instructions, and my questions have been answered. I have discussed any challenges I see with this plan with the nurse or doctor.    ..........................................................................................................................................  Patient/Patient Representative Signature      ..........................................................................................................................................  Patient Representative Print Name and Relationship to Patient    ..................................................               ................................................  Date                                            Time    ..........................................................................................................................................  Reviewed by Signature/Title    ...................................................              ..............................................  Date                                                            Time

## 2017-10-01 VITALS
TEMPERATURE: 98.3 F | DIASTOLIC BLOOD PRESSURE: 70 MMHG | RESPIRATION RATE: 18 BRPM | HEART RATE: 117 BPM | OXYGEN SATURATION: 91 % | SYSTOLIC BLOOD PRESSURE: 103 MMHG

## 2017-10-01 ASSESSMENT — ENCOUNTER SYMPTOMS
MYALGIAS: 0
CONFUSION: 0
DIZZINESS: 0
FREQUENCY: 0
ABDOMINAL PAIN: 0
NAUSEA: 0
NECK PAIN: 0
DIAPHORESIS: 0
SHORTNESS OF BREATH: 0
SLEEP DISTURBANCE: 0
COUGH: 0
ANAL BLEEDING: 0
PALPITATIONS: 0
CHEST TIGHTNESS: 0
NUMBNESS: 0
WEAKNESS: 0
HEADACHES: 0
DYSURIA: 0
VOMITING: 0
FLANK PAIN: 0
BACK PAIN: 0
BLOOD IN STOOL: 0
ABDOMINAL DISTENTION: 0
FEVER: 0
LIGHT-HEADEDNESS: 0
WHEEZING: 0
VOICE CHANGE: 0
CHILLS: 0
COLOR CHANGE: 0

## 2017-10-01 NOTE — ED NOTES
Discharge papers given to pt. Verbalizes understanding of d/c dx, follow up PRN with PCP.  IV out and site is [facility].  Rates ankle pain 3/10.  VS as charted.  Discharged to home via [place] taxi.  Security escorted to the ED door.  Alert, oriented, cooperative with staff.  Gait is steady with transfer with walker.

## 2017-10-01 NOTE — ED NOTES
O2 alarming 86%, patient awakened and sats up to 90%, patient reports he smokes 0.5-1.0 ppd-patient placed on 2L via NC  Patient requesting food, supervisor contacted to get meal for patient  Warm blanket applied and call light in hand

## 2017-10-01 NOTE — ED NOTES
Patient incont of large amount of urine.  Patient changed, cleaned and bedding changed.   Patient alert and ornt.   IV continues to infuse without incident.

## 2017-10-01 NOTE — ED PROVIDER NOTES
History     Chief Complaint   Patient presents with     Alcohol Intoxication     drank rick and orange juice     The history is provided by the patient and the EMS personnel.     Andrew Virgen is a 62 year old male who brought for alcohol intoxication and loss of his balance, denies trauma or falls, but his walker slightly touched his back and head when he lost his balance. No sign or symptoms of trauma.     I have reviewed the Medications, Allergies, Past Medical and Surgical History, and Social History in the Epic system.    Allergies:   Allergies   Allergen Reactions     Cocaine Shortness Of Breath and Other (See Comments)     Respiratory distress     Codeine      From [facility] record.      Penicillins          No current facility-administered medications on file prior to encounter.   Current Outpatient Prescriptions on File Prior to Encounter:  folic acid (FOLVITE) 1 MG tablet Take 1 tablet (1 mg) by mouth daily   multivitamin, therapeutic with minerals (THERA-VIT-M) TABS Take 1 tablet by mouth daily   magnesium oxide (MAG-OX) 400 (241.3 MG) MG tablet Take 1 tablet (400 mg) by mouth daily   lisinopril (PRINIVIL,ZESTRIL) 20 MG tablet Take 1 tablet (20 mg) by mouth daily   metoprolol (TOPROL-XL) 25 MG 24 hr tablet Take 1 tablet (25 mg) by mouth daily   clopidogrel (PLAVIX) 75 MG tablet Take 1 tablet (75 mg) by mouth daily   nitroglycerin (NITROSTAT) 0.4 MG SL tablet Place 1 tablet (0.4 mg) under the tongue every 5 minutes as needed for chest pain At the 1st sign of attack; may repeat every 5 min until relief; if pain persists after 3 tablets in 15 min, prompt medial attention is recommended AS NEEDED   order for DME Patella Stabilizing Brace Right Knee   tamsulosin (FLOMAX) 0.4 MG 24 hr capsule Take 1 capsule (0.4 mg) by mouth daily   thiamine 100 MG tablet Take 1 tablet (100 mg) by mouth daily   order for DME Equipment being ordered: bilateral knee sleeves   gabapentin (NEURONTIN) 300 MG  "capsule Take 1 capsule (300 mg) by mouth 4 times daily (Patient not taking: Reported on 3/8/2017)   simvastatin (ZOCOR) 5 MG tablet Take 2 tablets (10 mg) by mouth daily       Patient Active Problem List   Diagnosis     Acute alcohol intoxication, uncomplicated (H)     Chest pain of uncertain etiology     Acidosis - suspect alcoholic ketoacidosis     Hypokalemia     Hypomagnesemia     ETOH abuse     Possible - Urinary tract infection      Alcoholic ketoacidosis       Past Surgical History:   Procedure Laterality Date     heart surgery -1 stent  3/2009     pins in foot      fractures - right       Social History   Substance Use Topics     Smoking status: Current Every Day Smoker     Packs/day: 0.50     Years: 39.00     Types: Cigarettes     Smokeless tobacco: Not on file      Comment: tried to quit - longest period tobacco free: 6 months - passive smoke exposure     Alcohol use Yes      Comment: vodka 1/2 L daily       Most Recent Immunizations   Administered Date(s) Administered     Influenza Vaccine IM 3yrs+ 4 Valent IIV4 09/22/2016     Pneumococcal 23 valent 09/21/2016     TDAP Vaccine (Boostrix) 07/06/2013       BMI: Estimated body mass index is 24.39 kg/(m^2) as calculated from the following:    Height as of 4/6/17: 1.778 m (5' 10\").    Weight as of 4/6/17: 77.1 kg (170 lb).      Review of Systems   Constitutional: Negative for chills, diaphoresis and fever.   HENT: Negative for voice change.    Eyes: Negative for visual disturbance.   Respiratory: Negative for cough, chest tightness, shortness of breath and wheezing.    Cardiovascular: Negative for chest pain, palpitations and leg swelling.   Gastrointestinal: Negative for abdominal distention, abdominal pain, anal bleeding, blood in stool, nausea and vomiting.   Genitourinary: Negative for decreased urine volume, dysuria, flank pain and frequency.   Musculoskeletal: Negative for back pain, gait problem, myalgias and neck pain.   Skin: Negative for color " change, pallor and rash.   Neurological: Negative for dizziness, syncope, weakness, light-headedness, numbness and headaches.   Psychiatric/Behavioral: Negative for confusion, sleep disturbance and suicidal ideas.       Physical Exam   BP: 148/90  Heart Rate: 102  Temp: 98.4  F (36.9  C)  Resp: 16  SpO2: 93 %  Physical Exam   Constitutional: He is oriented to person, place, and time. He appears well-developed and well-nourished.   HENT:   Head: Normocephalic and atraumatic.   Mouth/Throat: No oropharyngeal exudate.   Eyes: Conjunctivae are normal. Pupils are equal, round, and reactive to light.   Neck: Normal range of motion. Neck supple. No JVD present. No tracheal deviation present. No thyromegaly present.   Cardiovascular: Normal rate, regular rhythm, normal heart sounds and intact distal pulses.  Exam reveals no gallop and no friction rub.    No murmur heard.  Pulmonary/Chest: Effort normal and breath sounds normal. No stridor. No respiratory distress. He has no wheezes. He has no rales. He exhibits no tenderness.   Abdominal: Soft. Bowel sounds are normal. He exhibits no distension and no mass. There is no tenderness. There is no rebound and no guarding.   Musculoskeletal: Normal range of motion. He exhibits edema. He exhibits no tenderness.   B/l pedal edmea to above ankle, no tenderness or redness   Lymphadenopathy:     He has no cervical adenopathy.   Neurological: He is alert and oriented to person, place, and time.   Skin: Skin is warm and dry. No rash noted. No erythema. No pallor.   Psychiatric: His behavior is normal.   Nursing note and vitals reviewed.      ED Course     ED Course     Procedures               Labs Ordered and Resulted from Time of ED Arrival Up to the Time of Departure from the ED   ALCOHOL ETHYL - Abnormal; Notable for the following:        Result Value    Ethanol g/dL 0.43 (*)     All other components within normal limits   COMPREHENSIVE METABOLIC PANEL - Abnormal; Notable for the  following:     Glucose 125 (*)     Creatinine 0.53 (*)     Calcium 8.3 (*)     All other components within normal limits   CBC WITH PLATELETS DIFFERENTIAL - Abnormal; Notable for the following:     WBC 17.1 (*)     MCH 34.6 (*)     RDW 15.7 (*)     Absolute Neutrophil 12.2 (*)     Absolute Monocytes 1.5 (*)     All other components within normal limits       Assessments & Plan (with Medical Decision Making)   Alcohol intoxication  Labs; elevated WBC non specific , denies fever chills   Chronic b/l leg edema  Pt woke up , able to walk steady, AAox3  Dc home  I have reviewed the nursing notes.    I have reviewed the findings, diagnosis, plan and need for follow up with the patient.      Discharge Medication List as of 10/1/2017  7:47 AM          Final diagnoses:   Alcoholic intoxication without complication (H)       9/30/2017   [facility]     Kaleb Martin MD  10/03/17 0355       Kaleb Martin MD  10/04/17 0224

## 2017-10-01 NOTE — ED NOTES
Patient arrived via [facility]. Patient reported he was drinking at a friends house and was walking with his walker fell over and hit side of head on door jam and then fell on his butt.  Patient reports drinking for almost 175 ml of rick. And reports drinking for the past 3-4 days and hasn't taken his meds. Patient refusing Detox

## 2017-10-01 NOTE — DISCHARGE INSTRUCTIONS
"  Alcohol Intoxication  Alcohol intoxication occurs when you drink alcohol faster than your liver can remove it from your system. The following facts are important to remember:    It can take 10 minutes or more to start to feel the effects of a drink, so you can easily get more intoxicated than you intended.    One drink may be more than 1 serving of alcohol. Depending on the drink, it can be 2 to 4 servings.    It takes about an hour for your body to metabolize (clear) 1 serving. If you have more than 1 drink, it can take a couple of hours or more.    Many things affect how drinks will affect you, including whether you ve eaten, how fast you drink, your size, how much you normally drink (or not), medicines you take, chronic diseases you have, and gender.  Signs and symptoms of alcohol poisoning  The following are signs and symptoms of alcohol poisoning:  Mild impairment    Reduced inhibitions    Slurred speech    Drowsiness    Decreased fine motor skills  Moderate impairment    Erratic behavior, aggression, depression    Impaired judgment    Confusion    Concentration difficulties    Coordination problems  Severe impairment    Vomiting    Seizures    Unconsciousness    Cold, clammy    Slow or irregular breathing    Hypothermia (low body temperature)    Coma  Health effects  Alcohol abuse causes health problems. Sometimes this can happen after only drinking a  little.\" There is no set number of drinks or amount of alcohol that defines too much. The more you drink at one time, and the more frequently you drink determine both the short-term and long-term health effects. It affects all parts of your body and your health, including your:    Brain. Alcohol is a central nervous system depressant. It can damage parts of the brain that affect your balance, memory, thinking, and emotions. It can cause memory loss, blackouts, depression, agitation, sleep cycle changes, and seizures. These changes may or may not be " reversible.    Heart and vascular system. Alcohol affects multiple areas. It can damage heart muscle causing cardiomyopathy, which is a weakening and stretching of the heart muscle. This can lead to trouble breathing, an irregular heartbeat, atrial fibrillation, leg swelling, and heart failure. It makes the blood vessels stiffen causing hypertension (high blood pressure). All of these problems increase your risk of having heart attacks or strokes.    Liver. Alcohol causes fat to build up in the liver, affecting its normal function. This increases the risk for hepatitis, leading to abdominal pain, appetite loss, jaundice, bleeding problems, liver fibrosis, and cirrhosis. This in turn can affect your ability to fight off infections, and can cause diabetes. The liver changes prevent it from removing toxins in your blood that can cause encephalopathy. Signs of this are confusion, altered level of consciousness, personality changes, memory loss, seizures, coma, and death.    Pancreas. Alcohol can cause inflammation of the pancreas, or pancreatitis. This can cause pain in your abdomen, fever, and diabetes.    Immune system. Alcohol weakens your immune system in a number of ways. It suppresses your immune system making it harder to fight off infections and colds. You will also have a higher risk of certain infections like pneumonia and tuberculosis.    Cancer risk. Alcohol raises your risk of cancer of the mouth, esophagus, pharynx, larynx, liver, and breast.    Sexual function. Alcohol abuse can also lead to sexual problems.  Alcohol use during pregnancy may cause permanent damage to the growing baby.  Home care  The following guidelines will help you care for yourself at home:    Don't drink any more alcohol.    Don't drive until all effects of the alcohol have worn off.    Don't operate machinery that can cause injuries.    Get lots of rest over the next few days. Drink plenty of water and other non-alcoholic liquids.  Try to eat regular meals.    If you have been drinking heavily on a daily basis, you may go through alcohol withdrawal. The usual symptoms last 3 to 4 days and may include nervousness, shakiness, nausea, sweating, sleeplessness, and can even cause seizures and a serious withdrawal symptom called delirium tremens, or DTs. During this time, it is best that you stay with family or friends who can help and support you. You can also admit yourself to a residential detox program. If your symptoms are severe (seizures, severe shakiness, confusion), contact your doctor or call an ambulance for help (see below).   Follow-up care  If alcohol is a problem in your life, these are some organizations that can help you:    Alcoholics Anonymous offers support through a self-help fellowship. There are no dues or fees. See the Yellow Pages and call for time and place of meetings. Find AA online at www.aa.org.    Med offers support to families of alcohol users. Contact 917-160-1235, or online at www.al-anoisadora.org.    [employer] on Alcoholism and Drug Dependence can be reached at 827-091-6163, or online at www.ncadd.org.    There are also inpatient and residential alcohol detox programs. Check the Internet or phonebook Yellow Pages under  Drug Abuse and Treatment Centers.   Call 911  Call 911 if any of these occur:    Trouble breathing or slow irregular breathing    Chest pain    Sudden weakness on one side of your body or sudden trouble speaking    Heavy bleeding or vomiting blood    Very drowsy or trouble awakening    Fainting or loss of consciousness    Rapid heart rate    Seizure  When to seek medical advice  Call your healthcare provider right away if any of these occur:    Severe shakiness     Fever over 100.4  F (38.0  C)    Confusion or hallucinations (seeing, hearing, or feeling things that are not there)    Pain in your upper abdomen that gets worse    Repeated vomiting  Date Last Reviewed: 6/1/2016 2000-2017 [employer]  [employer]. [address] [place] [address], [place] [address]. All rights reserved. This information is not intended as a substitute for professional medical care. Always follow your healthcare professional's instructions.

## 2017-11-03 ENCOUNTER — TELEPHONE (OUTPATIENT)
Dept: INTERNAL MEDICINE | Facility: OTHER | Age: 62
End: 2017-11-03

## 2017-11-03 ENCOUNTER — TELEPHONE (OUTPATIENT)
Dept: FAMILY MEDICINE | Facility: OTHER | Age: 62
End: 2017-11-03

## 2017-11-03 NOTE — TELEPHONE ENCOUNTER
1:22 PM    Reason for Call: Phone Call    Description: Pt called and stated he wanted to est care with Dr Falk and that he had a referral from Dr Moreno. I didn't see that pt has seen provider before, so just need to clarify that Dr Falk did pt on? Please call him back at [phone]    Was an appointment offered for this call? No  If yes : Appointment type              Date    Preferred method for responding to this message: Telephone Call  What is your phone number ?    If we cannot reach you directly, may we leave a detailed response at the number you provided? Yes    Can this message wait until your PCP/provider returns, if available today? Not applicable    Loyda Pérez

## 2017-11-30 NOTE — TELEPHONE ENCOUNTER
Pt called to check on status of message. Has been advised that he needs to est care with a family practice provider first and they would have to refer him to Dr Falk if necessary.

## 2017-12-04 ENCOUNTER — OFFICE VISIT (OUTPATIENT)
Dept: FAMILY MEDICINE | Facility: OTHER | Age: 62
End: 2017-12-04
Attending: FAMILY MEDICINE
Payer: COMMERCIAL

## 2017-12-04 VITALS
HEART RATE: 105 BPM | BODY MASS INDEX: 24.82 KG/M2 | SYSTOLIC BLOOD PRESSURE: 154 MMHG | TEMPERATURE: 99 F | WEIGHT: 173 LBS | DIASTOLIC BLOOD PRESSURE: 78 MMHG | OXYGEN SATURATION: 98 %

## 2017-12-04 DIAGNOSIS — Z23 NEED FOR PROPHYLACTIC VACCINATION AND INOCULATION AGAINST INFLUENZA: ICD-10-CM

## 2017-12-04 DIAGNOSIS — R35.0 URINARY FREQUENCY: ICD-10-CM

## 2017-12-04 DIAGNOSIS — Z95.5 HISTORY OF HEART ARTERY STENT: ICD-10-CM

## 2017-12-04 DIAGNOSIS — G62.9 NEUROPATHY: ICD-10-CM

## 2017-12-04 DIAGNOSIS — I25.2 HISTORY OF MYOCARDIAL INFARCTION: ICD-10-CM

## 2017-12-04 DIAGNOSIS — F10.10 ETOH ABUSE: ICD-10-CM

## 2017-12-04 DIAGNOSIS — Z11.59 NEED FOR HEPATITIS C SCREENING TEST: ICD-10-CM

## 2017-12-04 DIAGNOSIS — E87.6 HYPOKALEMIA: Primary | ICD-10-CM

## 2017-12-04 DIAGNOSIS — E83.42 HYPOMAGNESEMIA: ICD-10-CM

## 2017-12-04 PROBLEM — E78.00 HYPERCHOLESTEROLEMIA: Status: ACTIVE | Noted: 2017-12-04

## 2017-12-04 LAB
ALBUMIN SERPL-MCNC: 3.3 G/DL (ref 3.4–5)
ALP SERPL-CCNC: 154 U/L (ref 40–150)
ALT SERPL W P-5'-P-CCNC: 46 U/L (ref 0–70)
ANION GAP SERPL CALCULATED.3IONS-SCNC: 9 MMOL/L (ref 3–14)
AST SERPL W P-5'-P-CCNC: 54 U/L (ref 0–45)
BASOPHILS # BLD AUTO: 0 10E9/L (ref 0–0.2)
BASOPHILS NFR BLD AUTO: 0.2 %
BILIRUB SERPL-MCNC: 0.6 MG/DL (ref 0.2–1.3)
BUN SERPL-MCNC: 5 MG/DL (ref 7–30)
CALCIUM SERPL-MCNC: 8.3 MG/DL (ref 8.5–10.1)
CHLORIDE SERPL-SCNC: 102 MMOL/L (ref 94–109)
CO2 SERPL-SCNC: 26 MMOL/L (ref 20–32)
CREAT SERPL-MCNC: 0.55 MG/DL (ref 0.66–1.25)
DIFFERENTIAL METHOD BLD: ABNORMAL
EOSINOPHIL # BLD AUTO: 0 10E9/L (ref 0–0.7)
EOSINOPHIL NFR BLD AUTO: 0.1 %
ERYTHROCYTE [DISTWIDTH] IN BLOOD BY AUTOMATED COUNT: 14.6 % (ref 10–15)
GFR SERPL CREATININE-BSD FRML MDRD: >90 ML/MIN/1.7M2
GLUCOSE SERPL-MCNC: 93 MG/DL (ref 70–99)
HCT VFR BLD AUTO: 43.9 % (ref 40–53)
HGB BLD-MCNC: 14.5 G/DL (ref 13.3–17.7)
IMM GRANULOCYTES # BLD: 0.1 10E9/L (ref 0–0.4)
IMM GRANULOCYTES NFR BLD: 0.7 %
LYMPHOCYTES # BLD AUTO: 1.3 10E9/L (ref 0.8–5.3)
LYMPHOCYTES NFR BLD AUTO: 14.5 %
MAGNESIUM SERPL-MCNC: 1.6 MG/DL (ref 1.6–2.3)
MCH RBC QN AUTO: 35.2 PG (ref 26.5–33)
MCHC RBC AUTO-ENTMCNC: 33 G/DL (ref 31.5–36.5)
MCV RBC AUTO: 107 FL (ref 78–100)
MONOCYTES # BLD AUTO: 0.9 10E9/L (ref 0–1.3)
MONOCYTES NFR BLD AUTO: 9.4 %
NEUTROPHILS # BLD AUTO: 6.8 10E9/L (ref 1.6–8.3)
NEUTROPHILS NFR BLD AUTO: 75.1 %
NRBC # BLD AUTO: 0 10*3/UL
NRBC BLD AUTO-RTO: 0 /100
PLATELET # BLD AUTO: 260 10E9/L (ref 150–450)
POTASSIUM SERPL-SCNC: 3.2 MMOL/L (ref 3.4–5.3)
PROT SERPL-MCNC: 7.1 G/DL (ref 6.8–8.8)
RBC # BLD AUTO: 4.12 10E12/L (ref 4.4–5.9)
SODIUM SERPL-SCNC: 137 MMOL/L (ref 133–144)
WBC # BLD AUTO: 9.1 10E9/L (ref 4–11)

## 2017-12-04 PROCEDURE — 85025 COMPLETE CBC W/AUTO DIFF WBC: CPT | Mod: ZL | Performed by: FAMILY MEDICINE

## 2017-12-04 PROCEDURE — 82747 ASSAY OF FOLIC ACID RBC: CPT | Mod: ZL | Performed by: FAMILY MEDICINE

## 2017-12-04 PROCEDURE — 80053 COMPREHEN METABOLIC PANEL: CPT | Mod: ZL | Performed by: FAMILY MEDICINE

## 2017-12-04 PROCEDURE — 99213 OFFICE O/P EST LOW 20 MIN: CPT

## 2017-12-04 PROCEDURE — 36415 COLL VENOUS BLD VENIPUNCTURE: CPT | Mod: ZL | Performed by: FAMILY MEDICINE

## 2017-12-04 PROCEDURE — 86803 HEPATITIS C AB TEST: CPT | Mod: ZL | Performed by: FAMILY MEDICINE

## 2017-12-04 PROCEDURE — 90471 IMMUNIZATION ADMIN: CPT | Performed by: FAMILY MEDICINE

## 2017-12-04 PROCEDURE — 99214 OFFICE O/P EST MOD 30 MIN: CPT | Mod: 25 | Performed by: FAMILY MEDICINE

## 2017-12-04 PROCEDURE — 99000 SPECIMEN HANDLING OFFICE-LAB: CPT | Performed by: FAMILY MEDICINE

## 2017-12-04 PROCEDURE — 82607 VITAMIN B-12: CPT | Mod: ZL | Performed by: FAMILY MEDICINE

## 2017-12-04 PROCEDURE — 90686 IIV4 VACC NO PRSV 0.5 ML IM: CPT | Performed by: FAMILY MEDICINE

## 2017-12-04 PROCEDURE — 83735 ASSAY OF MAGNESIUM: CPT | Mod: ZL | Performed by: FAMILY MEDICINE

## 2017-12-04 RX ORDER — GABAPENTIN 300 MG/1
300 CAPSULE ORAL 4 TIMES DAILY
Qty: 120 CAPSULE | Refills: 1 | Status: SHIPPED | OUTPATIENT
Start: 2017-12-04

## 2017-12-04 RX ORDER — METOPROLOL SUCCINATE 25 MG/1
25 TABLET, EXTENDED RELEASE ORAL DAILY
Qty: 90 TABLET | Refills: 1 | Status: SHIPPED | OUTPATIENT
Start: 2017-12-04

## 2017-12-04 RX ORDER — CLOPIDOGREL BISULFATE 75 MG/1
75 TABLET ORAL DAILY
Qty: 90 TABLET | Refills: 1 | Status: SHIPPED | OUTPATIENT
Start: 2017-12-04

## 2017-12-04 RX ORDER — TAMSULOSIN HYDROCHLORIDE 0.4 MG/1
0.4 CAPSULE ORAL DAILY
Qty: 90 CAPSULE | Refills: 1 | Status: SHIPPED | OUTPATIENT
Start: 2017-12-04

## 2017-12-04 RX ORDER — SIMVASTATIN 5 MG
10 TABLET ORAL DAILY
Qty: 90 TABLET | Refills: 1 | Status: SHIPPED | OUTPATIENT
Start: 2017-12-04

## 2017-12-04 RX ORDER — LISINOPRIL 20 MG/1
20 TABLET ORAL DAILY
Qty: 90 TABLET | Refills: 1 | Status: SHIPPED | OUTPATIENT
Start: 2017-12-04

## 2017-12-04 ASSESSMENT — PAIN SCALES - GENERAL: PAINLEVEL: SEVERE PAIN (6)

## 2017-12-04 NOTE — PROGRESS NOTES

## 2017-12-04 NOTE — PROGRESS NOTES
SUBJECTIVE:   Andrew Virgen is a 62 year old male who presents to clinic today for the following health issues:      Musculoskeletal problem/pain      Duration: Since Summer     Description  Location: bilateral feet swelling     Intensity:  moderate    Accompanying signs and symptoms: numbness and tingling    History  Previous similar problem: no   Previous evaluation:  none    Precipitating or alleviating factors:  Trauma or overuse: no   Aggravating factors include: none    Therapies tried and outcome: elevated, history of fracture of ankle swelling         He's had multiple ER visits for alcohol abuse.  He states all his friends are working and he is at home.  He does live near the behavioral health facility and used to go over there a lot and visit.  Denies feeling suicidal.  Needs all of his meds refilled.   Chronic numbness tingling in his feet  [facility]    Andrew Virgen, 62 year old, male presents with   Chief Complaint   Patient presents with     Establish Care     was recommended by Dr. Moreno to see you.        PAST MEDICAL HISTORY:  Past Medical History:   Diagnosis Date     Acute myocardial infarction of other specified sit 3/10/2011     Hypertension, benign 3/10/2011     Osteoarthrosis, unspecified whether generalized or  3/10/2011     Other and unspecified alcohol dependence, unspecif 3/10/2011       PAST SURGICAL HISTORY:  Past Surgical History:   Procedure Laterality Date     heart surgery -1 stent  3/2009     pins in foot      fractures - right       MEDICATIONS:  Prior to Admission medications    Medication Sig Start Date End Date Taking? Authorizing Provider   simvastatin (ZOCOR) 5 MG tablet Take 2 tablets (10 mg) by mouth daily 12/4/17  Yes SUBHASH Garcia MD   metoprolol (TOPROL-XL) 25 MG 24 hr tablet Take 1 tablet (25 mg) by mouth daily 12/4/17  Yes SUBHASH Garcia MD   lisinopril (PRINIVIL/ZESTRIL) 20 MG tablet Take 1 tablet (20 mg) by mouth daily 12/4/17  Yes  SUBHASH Garcia MD   gabapentin (NEURONTIN) 300 MG capsule Take 1 capsule (300 mg) by mouth 4 times daily 12/4/17  Yes SUBHASH Garcia MD   clopidogrel (PLAVIX) 75 MG tablet Take 1 tablet (75 mg) by mouth daily 12/4/17  Yes SUBHASH Garcia MD   magnesium oxide (MAG-OX) 400 (241.3 MG) MG tablet Take 1 tablet (400 mg) by mouth daily 12/4/17  Yes SUBHASH Garcia MD   tamsulosin (FLOMAX) 0.4 MG capsule Take 1 capsule (0.4 mg) by mouth daily 12/4/17  Yes SUBHASH Garcia MD   folic acid (FOLVITE) 1 MG tablet Take 1 tablet (1 mg) by mouth daily  Patient not taking: Reported on 12/4/2017 9/23/16   Jie Murrieta MD   multivitamin, therapeutic with minerals (THERA-VIT-M) TABS Take 1 tablet by mouth daily  Patient not taking: Reported on 12/4/2017 9/23/16   Jie Murrieta MD   thiamine 100 MG tablet Take 1 tablet (100 mg) by mouth daily  Patient not taking: Reported on 12/4/2017 9/23/16   Jie Murrieta MD   nitroglycerin (NITROSTAT) 0.4 MG SL tablet Place 1 tablet (0.4 mg) under the tongue every 5 minutes as needed for chest pain At the 1st sign of attack; may repeat every 5 min until relief; if pain persists after 3 tablets in 15 min, prompt medial attention is recommended AS NEEDED  Patient not taking: Reported on 12/4/2017 12/6/15   Caryn Cabral MD       ALLERGIES:     Allergies   Allergen Reactions     Cocaine Shortness Of Breath and Other (See Comments)     Respiratory distress     Codeine      From [facility] record.      Penicillins        ROS:  Constitutional, neuro, ENT, endocrine, pulmonary, cardiac, gastrointestinal, genitourinary, musculoskeletal, integument and psychiatric systems are negative, except as otherwise noted.        EXAM:/78 (BP Location: Left arm, Patient Position: Chair, Cuff Size: Adult Large)  Pulse 105  Temp 99  F (37.2  C) (Tympanic)  Wt 173 lb (78.5 kg)  SpO2 98%  BMI 24.82 kg/m2 Body mass index is 24.82 kg/(m^2).   GENERAL APPEARANCE: healthy,  no distress  EYES:  Eyes grossly normal to inspection, PERRL and conjunctivae and sclerae normal  NECK: no adenopathy, no asymmetry, masses, or scars and thyroid normal to palpation  RESP: lungs clear to auscultation - no rales, rhonchi or wheezes  CV: regular rates and rhythm, normal S1 S2, no S3 or S4 and no murmur, click or rub  ABDOMEN: soft, nontender, without hepatosplenomegaly or masses and bowel sounds normal  MS: extremities normal- no gross deformities noted  DIABETIC FOOT EXAM: normal DP and PT pulses, no trophic changes or ulcerative lesions and has decreased sensation on the plantar aspect of his feet  Lab/ X-ray  Pending      ASSESSMENT/PLAN:    ICD-10-CM    1. Hypokalemia E87.6 Comprehensive metabolic panel   2. Hypomagnesemia E83.42 magnesium oxide (MAG-OX) 400 (241.3 MG) MG tablet   3. History of myocardial infarction I25.2 simvastatin (ZOCOR) 5 MG tablet     Comprehensive metabolic panel   4. History of heart artery stent Z95.5 metoprolol (TOPROL-XL) 25 MG 24 hr tablet     lisinopril (PRINIVIL/ZESTRIL) 20 MG tablet     clopidogrel (PLAVIX) 75 MG tablet   5. Urinary frequency R35.0 tamsulosin (FLOMAX) 0.4 MG capsule   6. Neuropathy G62.9 gabapentin (NEURONTIN) 300 MG capsule     CBC with platelets differential     Vitamin B12 draw above labs will see him in 2 weeks to recheck     Folate RBC   7. Need for hepatitis C screening test Z11.59 Hepatitis C antibody   8. ETOH abuse F10.10 Discussed limiting alcohol and getting out and interacting with people more. Flu shot given         SARAI Garcia MD  December 4, 2017

## 2017-12-04 NOTE — NURSING NOTE
"Chief Complaint   Patient presents with     Establish Care     was recommended by Dr. Moreno to see you.        Initial /78 (BP Location: Left arm, Patient Position: Chair, Cuff Size: Adult Large)  Pulse 105  Temp 99  F (37.2  C) (Tympanic)  Wt 173 lb (78.5 kg)  SpO2 98%  BMI 24.82 kg/m2 Estimated body mass index is 24.82 kg/(m^2) as calculated from the following:    Height as of 4/6/17: 5' 10\" (1.778 m).    Weight as of this encounter: 173 lb (78.5 kg).  Medication Reconciliation: complete     ALPA ODOM      "

## 2017-12-04 NOTE — MR AVS SNAPSHOT
After Visit Summary   12/4/2017    Andrew Virgen    MRN: [id number]           Patient Information     Date Of Birth          1955        Visit Information        Provider Department      12/4/2017 1:15 PM SUBHASH Garcia MD [facility] (formerly [facility])        Today's Diagnoses     Hypokalemia    -  1    Hypomagnesemia        History of myocardial infarction        History of heart artery stent        Urinary frequency        Neuropathy        Need for hepatitis C screening test        ETOH abuse           Follow-ups after your visit        Follow-up notes from your care team     Return in about 2 weeks (around 12/18/2017).      Your next 10 appointments already scheduled     Dec 18, 2017  9:45 AM CST   (Arrive by 9:30 AM)   Office Visit with SUBHASH Garcia MD   [facility] [place] ([facility] )    [address]  [place] [address]   [phone]           Bring a current list of meds and any records pertaining to this visit.  For Physicals, please bring immunization records and any forms needing to be filled out.  Please arrive 15 minutes early to complete paperwork and register.              Who to contact     If you have questions or need follow up information about today's clinic visit or your schedule please contact [facility] directly at [phone].  Normal or non-critical lab and imaging results will be communicated to you by MyChart, letter or phone within 4 business days after the clinic has received the results. If you do not hear from us within 7 days, please contact the clinic through MyChart or phone. If you have a critical or abnormal lab result, we will notify you by phone as soon as possible.  Submit refill requests through [employer] or call your pharmacy and they will forward the refill request to us. Please allow 3 business days for your refill to be completed.          Additional Information About Your Visit        MyChart Information      "[employer] lets you send messages to your doctor, view your test results, renew your prescriptions, schedule appointments and more. To sign up, go to [email or web address] . Click on \"Log in\" on the left side of the screen, which will take you to the Welcome page. Then click on \"Sign up Now\" on the right side of the page.     You will be asked to enter the access code listed below, as well as some personal information. Please follow the directions to create your username and password.     Your access code is: [email or web address]  Expires: 2017  6:47 AM     Your access code will  in 90 days. If you need help or a new code, please call your [place] clinic or [phone].        Care EveryWhere ID     This is your Care EveryWhere ID. This could be used by other organizations to access your [place] medical records  [id number]        Your Vitals Were     Pulse Temperature Pulse Oximetry BMI (Body Mass Index)          105 99  F (37.2  C) (Tympanic) 98% 24.82 kg/m2         Blood Pressure from Last 3 Encounters:   17 154/78   10/01/17 103/70   17 113/73    Weight from Last 3 Encounters:   17 173 lb (78.5 kg)   17 170 lb (77.1 kg)   17 160 lb (72.6 kg)              We Performed the Following     CBC with platelets differential     Comprehensive metabolic panel     Folate RBC     Hepatitis C antibody     Vitamin B12          Today's Medication Changes          These changes are accurate as of: 17  1:39 PM.  If you have any questions, ask your nurse or doctor.               Stop taking these medicines if you haven't already. Please contact your care team if you have questions.     order for DME   Stopped by:  SUBHASH Garcia MD                Where to get your medicines      These medications were sent to [facility] - [address]  [address] [place] [address]     Phone:  [phone]     clopidogrel 75 MG tablet    gabapentin 300 MG capsule    " lisinopril 20 MG tablet    magnesium oxide 400 (241.3 MG) MG tablet    metoprolol 25 MG 24 hr tablet    simvastatin 5 MG tablet    tamsulosin 0.4 MG capsule                Primary Care Provider Office Phone # Fax #    R Stephon Garcia [phone]       [facility] [place] [address] [place] [id number]        Equal Access to Services     [facility] : Hadii aad ku hadasho Soomaali, waaxda luqadaha, qaybta kaalmada adeegyada, waxay idiin hayaan adeeg kharash laJosefaaan ah. So [facility] [phone].    ATENCIÓN: Si habla español, tiene a bashir disposición servicios gratuitos de asistencia lingüística. Ll[facility] [phone].    We comply with applicable federal civil rights laws and [place] laws. We do not discriminate on the basis of race, color, national origin, age, disability, sex, sexual orientation, or gender identity.            Thank you!     Thank you for choosing [facility]  for your care. Our goal is always to provide you with excellent care. Hearing back from our patients is one way we can continue to improve our services. Please take a few minutes to complete the written survey that you may receive in the mail after your visit with us. Thank you!             Your Updated Medication List - Protect others around you: Learn how to safely use, store and throw away your medicines at www.disposemymeds.org.          This list is accurate as of: 12/4/17  1:39 PM.  Always use your most recent med list.                   Brand Name Dispense Instructions for use Diagnosis    clopidogrel 75 MG tablet    PLAVIX    90 tablet    Take 1 tablet (75 mg) by mouth daily    History of heart artery stent       folic acid 1 MG tablet    FOLVITE    30 tablet    Take 1 tablet (1 mg) by mouth daily    ETOH abuse       gabapentin 300 MG capsule    NEURONTIN    120 capsule    Take 1 capsule (300 mg) by mouth 4 times daily    Neuropathy       lisinopril 20 MG tablet    PRINIVIL/ZESTRIL    90 tablet     Take 1 tablet (20 mg) by mouth daily    History of heart artery stent       magnesium oxide 400 (241.3 MG) MG tablet    MAG-OX    90 tablet    Take 1 tablet (400 mg) by mouth daily    Hypomagnesemia       metoprolol 25 MG 24 hr tablet    TOPROL-XL    90 tablet    Take 1 tablet (25 mg) by mouth daily    History of heart artery stent       multivitamin, therapeutic with minerals Tabs tablet     30 each    Take 1 tablet by mouth daily    Alcoholic ketoacidosis, ETOH abuse       nitroGLYcerin 0.4 MG sublingual tablet    NITROSTAT    10 tablet    Place 1 tablet (0.4 mg) under the tongue every 5 minutes as needed for chest pain At the 1st sign of attack; may repeat every 5 min until relief; if pain persists after 3 tablets in 15 min, prompt medial attention is recommended AS NEEDED        simvastatin 5 MG tablet    ZOCOR    90 tablet    Take 2 tablets (10 mg) by mouth daily    History of myocardial infarction       tamsulosin 0.4 MG capsule    FLOMAX    90 capsule    Take 1 capsule (0.4 mg) by mouth daily    Urinary frequency       thiamine 100 MG tablet     30 tablet    Take 1 tablet (100 mg) by mouth daily    ETOH abuse

## 2017-12-05 LAB — VIT B12 SERPL-MCNC: 356 PG/ML (ref 193–986)

## 2017-12-06 LAB — HCV AB SERPL QL IA: NONREACTIVE

## 2017-12-07 ENCOUNTER — HOSPITAL ENCOUNTER (EMERGENCY)
Facility: HOSPITAL | Age: 62
Discharge: HOME | End: 2017-12-07
Attending: FAMILY MEDICINE | Admitting: FAMILY MEDICINE
Payer: COMMERCIAL

## 2017-12-07 VITALS
RESPIRATION RATE: 18 BRPM | SYSTOLIC BLOOD PRESSURE: 146 MMHG | DIASTOLIC BLOOD PRESSURE: 95 MMHG | OXYGEN SATURATION: 97 % | TEMPERATURE: 97.9 F

## 2017-12-07 DIAGNOSIS — F10.220 ACUTE ALCOHOLIC INTOXICATION IN ALCOHOLISM WITHOUT COMPLICATION (H): ICD-10-CM

## 2017-12-07 LAB
FOLATE RBC-MCNC: 316 NG/ML
HCT VFR BLD CALC: 43.9 %

## 2017-12-07 PROCEDURE — 99281 EMR DPT VST MAYX REQ PHY/QHP: CPT | Performed by: FAMILY MEDICINE

## 2017-12-07 PROCEDURE — 99281 EMR DPT VST MAYX REQ PHY/QHP: CPT

## 2017-12-07 RX ORDER — SODIUM CHLORIDE 9 MG/ML
1000 INJECTION, SOLUTION INTRAVENOUS CONTINUOUS
Status: DISCONTINUED | OUTPATIENT
Start: 2017-12-07 | End: 2017-12-07 | Stop reason: HOSPADM

## 2017-12-07 NOTE — ED NOTES
Patient continues to refuse any assessment. Refuses detox. Continues to yell he is just going home. Patient's friend Denise was going to bring patient home but has not arrived and has now shut his cell phone off. Patient states there is no one else to get him home. Todd is unable to get patient home due to he does need assistance getting into the home as he does not have his walker here.

## 2017-12-07 NOTE — ED NOTES
"Refused to sign AMA paperwork. States he is going home and \"doesn't need that shit.\" [place] [employer] here to bring patient home. Patient assisted into police car. Tolerated well. Left in care of [place] [employer].   "

## 2017-12-07 NOTE — ED NOTES
Call to 911 to request [place] [employer] call ER to see if they might assist in getting patient home. Awaiting call back. Patient ambulated with walker and SBA and did well.

## 2017-12-07 NOTE — ED NOTES
"Arrived to ED room 6 via [facility]. Patient yelling and swinging at staff. States, \"Get the fuck away from me, I don't want to be here, I'm going home.\" [facility]ics report patient lives in the basement of a home and when a friend stopped to check on him noticed that his feet and lower legs were swollen and very cold so called 911. This nurse explained to patient that we are here to help him and make sure he is OK but he needs to cooperate, not swear or swing at staff, and allow his clothing to be removed and gown put on so we can assess him and check his vital signs. Patient yelled, \"I'm not taking my clothes off, are you going to take your clothes off, I don't need to be here, I'm going home.\" Dr. Euceda informed of patient's complaints of not wanting to be here. Dr. Euceda came to bedside and spoke with patient and assessed patient. When patient asked how much he had been drinking today he states, \"not enough.\" Patient called friend Denise Whipple and states he is coming to pick him up and bring him home. Dr. Euceda aware of this.   "

## 2017-12-07 NOTE — PROGRESS NOTES
Patient presents to the ER today intoxicated. Discussed with patient detox and treatment and he declines this at this time and states he's done detox and alcohol treatment many times and it doesn't work. He declines any lists of treatment programs at this time. He states he just wants to go home. Updated provider on patient's wishes. Patient states he does not have a way to get home today. Taxi ride scheduled with taxi voucher.

## 2017-12-08 NOTE — ED PROVIDER NOTES
"Patient arrived via EMS, yelling profanities.  He stated he wanted to go home immediately, refused vital signs or any blood work or evaluation.  He states he is drunk, and would like to be drunker.  He further states he does not want treatment of any sort, that he has been through that a number of times and it \"does not work\" for him.  EMS stated his home is \"not bad\", but that he crawls around in his basement apartment.  He has a walker and is capable of using it as demonstrated here, but chooses to crawl.  He adamantly refuses to stay, states he is leaving now, so D was arranged for transportation as EMS refused to take him and [facility] won't take him due to intoxication.  He refused to sign AMA papers.  He had a single set of vital signs and no other medical testing or treatment.     Yanna Watson MD  12/08/17 0831    "

## 2017-12-18 ENCOUNTER — OFFICE VISIT (OUTPATIENT)
Dept: FAMILY MEDICINE | Facility: OTHER | Age: 62
End: 2017-12-18
Attending: FAMILY MEDICINE
Payer: COMMERCIAL

## 2017-12-18 VITALS
HEIGHT: 70 IN | SYSTOLIC BLOOD PRESSURE: 136 MMHG | TEMPERATURE: 98.7 F | WEIGHT: 173 LBS | HEART RATE: 119 BPM | DIASTOLIC BLOOD PRESSURE: 74 MMHG | BODY MASS INDEX: 24.77 KG/M2 | OXYGEN SATURATION: 99 %

## 2017-12-18 DIAGNOSIS — F10.10 ETOH ABUSE: ICD-10-CM

## 2017-12-18 DIAGNOSIS — E87.6 HYPOKALEMIA: ICD-10-CM

## 2017-12-18 DIAGNOSIS — Z72.0 TOBACCO ABUSE: ICD-10-CM

## 2017-12-18 DIAGNOSIS — I10 ESSENTIAL HYPERTENSION, BENIGN: ICD-10-CM

## 2017-12-18 DIAGNOSIS — Z71.6 TOBACCO ABUSE COUNSELING: ICD-10-CM

## 2017-12-18 DIAGNOSIS — R20.9 COLD FEET: Primary | ICD-10-CM

## 2017-12-18 LAB
ANION GAP SERPL CALCULATED.3IONS-SCNC: 9 MMOL/L (ref 3–14)
BUN SERPL-MCNC: 6 MG/DL (ref 7–30)
CALCIUM SERPL-MCNC: 8.6 MG/DL (ref 8.5–10.1)
CHLORIDE SERPL-SCNC: 103 MMOL/L (ref 94–109)
CO2 SERPL-SCNC: 26 MMOL/L (ref 20–32)
CREAT SERPL-MCNC: 0.57 MG/DL (ref 0.66–1.25)
GFR SERPL CREATININE-BSD FRML MDRD: >90 ML/MIN/1.7M2
GLUCOSE SERPL-MCNC: 122 MG/DL (ref 70–99)
POTASSIUM SERPL-SCNC: 3.5 MMOL/L (ref 3.4–5.3)
SODIUM SERPL-SCNC: 138 MMOL/L (ref 133–144)

## 2017-12-18 PROCEDURE — 99212 OFFICE O/P EST SF 10 MIN: CPT

## 2017-12-18 PROCEDURE — 99214 OFFICE O/P EST MOD 30 MIN: CPT | Performed by: FAMILY MEDICINE

## 2017-12-18 PROCEDURE — 80048 BASIC METABOLIC PNL TOTAL CA: CPT | Mod: ZL | Performed by: FAMILY MEDICINE

## 2017-12-18 PROCEDURE — 36415 COLL VENOUS BLD VENIPUNCTURE: CPT | Mod: ZL | Performed by: FAMILY MEDICINE

## 2017-12-18 ASSESSMENT — ANXIETY QUESTIONNAIRES
1. FEELING NERVOUS, ANXIOUS, OR ON EDGE: NEARLY EVERY DAY
IF YOU CHECKED OFF ANY PROBLEMS ON THIS QUESTIONNAIRE, HOW DIFFICULT HAVE THESE PROBLEMS MADE IT FOR YOU TO DO YOUR WORK, TAKE CARE OF THINGS AT HOME, OR GET ALONG WITH OTHER PEOPLE: NOT DIFFICULT AT ALL
5. BEING SO RESTLESS THAT IT IS HARD TO SIT STILL: NOT AT ALL
7. FEELING AFRAID AS IF SOMETHING AWFUL MIGHT HAPPEN: SEVERAL DAYS
6. BECOMING EASILY ANNOYED OR IRRITABLE: NEARLY EVERY DAY
GAD7 TOTAL SCORE: 16
3. WORRYING TOO MUCH ABOUT DIFFERENT THINGS: NEARLY EVERY DAY
2. NOT BEING ABLE TO STOP OR CONTROL WORRYING: NEARLY EVERY DAY
4. TROUBLE RELAXING: NEARLY EVERY DAY

## 2017-12-18 ASSESSMENT — PATIENT HEALTH QUESTIONNAIRE - PHQ9: SUM OF ALL RESPONSES TO PHQ QUESTIONS 1-9: 3

## 2017-12-18 ASSESSMENT — PAIN SCALES - GENERAL: PAINLEVEL: MILD PAIN (3)

## 2017-12-18 NOTE — PROGRESS NOTES
SUBJECTIVE:   Andrew Virgen is a 62 year old male who presents to clinic today for the following health issues:      ED/UC Followup:    Facility:  [facility] ED  Date of visit: 12/07/17  Reason for visit: Left foot swelling.   Current Status: no more swelling in foot. Has been elevating foot.  Foot swells up after walking though.         Has had left foot discomfort at times over the last year.  Was in the emergency room.  Had a ankle fracture in the past.  Does have problems with chronic alcoholism.  Has hypertension was here last visit and had hypokalemia.  He never did get his potassium supplement.  It was slightly decreased at that visit.  We will recheck today.  He denies any chest pain or shortness of breath.  Feels at times his feet are cold.         PAST MEDICAL HISTORY:  Past Medical History:   Diagnosis Date     Acute myocardial infarction of other specified sit 3/10/2011     Hypertension, benign 3/10/2011     Osteoarthrosis, unspecified whether generalized or  3/10/2011     Other and unspecified alcohol dependence, unspecif 3/10/2011       PAST SURGICAL HISTORY:  Past Surgical History:   Procedure Laterality Date     heart surgery -1 stent  3/2009     pins in foot      fractures - right       MEDICATIONS:  Prior to Admission medications    Medication Sig Start Date End Date Taking? Authorizing Provider   simvastatin (ZOCOR) 5 MG tablet Take 2 tablets (10 mg) by mouth daily 12/4/17  Yes SUBHASH Garcia MD   metoprolol (TOPROL-XL) 25 MG 24 hr tablet Take 1 tablet (25 mg) by mouth daily 12/4/17  Yes SUBHASH Garcia MD   lisinopril (PRINIVIL/ZESTRIL) 20 MG tablet Take 1 tablet (20 mg) by mouth daily 12/4/17  Yes SUBHASH Garcia MD   gabapentin (NEURONTIN) 300 MG capsule Take 1 capsule (300 mg) by mouth 4 times daily 12/4/17  Yes SUBHASH Garcia MD   clopidogrel (PLAVIX) 75 MG tablet Take 1 tablet (75 mg) by mouth daily 12/4/17  Yes SUBHASH Garcia MD   magnesium oxide (MAG-OX) 400 (241.3 MG) MG tablet Take 1  "tablet (400 mg) by mouth daily 12/4/17  Yes SUBHASH Garcia MD   tamsulosin (FLOMAX) 0.4 MG capsule Take 1 capsule (0.4 mg) by mouth daily 12/4/17  Yes SUBHASH Garcia MD   folic acid (FOLVITE) 1 MG tablet Take 1 tablet (1 mg) by mouth daily 9/23/16  Yes Jie Murrieta MD   multivitamin, therapeutic with minerals (THERA-VIT-M) TABS Take 1 tablet by mouth daily 9/23/16  Yes Jie Murrieta MD   thiamine 100 MG tablet Take 1 tablet (100 mg) by mouth daily 9/23/16  Yes Jie Murrieta MD   nitroglycerin (NITROSTAT) 0.4 MG SL tablet Place 1 tablet (0.4 mg) under the tongue every 5 minutes as needed for chest pain At the 1st sign of attack; may repeat every 5 min until relief; if pain persists after 3 tablets in 15 min, prompt medial attention is recommended AS NEEDED 12/6/15  Yes Caryn Cabral MD       ALLERGIES:     Allergies   Allergen Reactions     Cocaine Shortness Of Breath and Other (See Comments)     Respiratory distress     Codeine      From [facility] record.      Penicillins        ROS:  Constitutional, neuro, ENT, endocrine, pulmonary, cardiac, gastrointestinal, genitourinary, musculoskeletal, integument and psychiatric systems are negative, except as otherwise noted.        EXAM:/74 (BP Location: Left arm, Patient Position: Chair, Cuff Size: Adult Regular)  Pulse 119  Temp 98.7  F (37.1  C) (Tympanic)  Ht 5' 10\" (1.778 m)  Wt 173 lb (78.5 kg)  SpO2 99%  BMI 24.82 kg/m2 Body mass index is 24.82 kg/(m^2).   GENERAL APPEARANCE: healthy, alert and no distress  EYES: Eyes grossly normal to inspection, PERRL and conjunctivae and sclerae normal  HENT:  nose and mouth without ulcers or lesions  RESP: lungs clear to auscultation - no rales, rhonchi or wheezes  ABDOMEN: soft, nontender, without hepatosplenomegaly or masses and bowel sounds normal  NEURO: Normal strength and tone, mentation intact and speech normal  DIABETIC FOOT EXAM: Decreased DP and PT pulses, good capillary refill , no " trophic changes or ulcerative lesions and normal sensory exam  Lab/ X-ray  Pending  STEPHAN-7 SCORE 12/18/2017   Total Score 16     PHQ-9 SCORE 12/18/2017   Total Score 3         ASSESSMENT/PLAN:    ICD-10-CM    1. Cold feet R20.9 US CAITLIN Doppler No Exercise 1-2 Levels Bilateral.  Check an ankle-brachial index.  Told him  the hospital will call him for that test.   2. Tobacco abuse Z72.0 Tobacco Cessation - Order to Satisfy Health Maintenance   3. Tobacco abuse counseling Z71.6  stress importance for him to quit smoking   4. Hypokalemia E87.6 Basic metabolic panel will call with results blood pressure is stable   5. Essential hypertension, benign I10 Basic metabolic panel   6. ETOH abuse F10.10  offered help for his alcoholism and he declines         R. Stephon Garcia MD  December 18, 2017

## 2017-12-18 NOTE — MR AVS SNAPSHOT
After Visit Summary   12/18/2017    Andrew Virgen    MRN: [id number]           Patient Information     Date Of Birth          1955        Visit Information        Provider Department      12/18/2017 9:45 AM SUBHASH Garcia MD [facility]        Today's Diagnoses     Cold feet    -  1    Tobacco abuse        Tobacco abuse counseling        Hypokalemia        Essential hypertension, benign          Care Instructions      HOW TO QUIT SMOKING  Smoking is one of the hardest habits to break. About half of all those who have ever smoked have been able to quit, and most of those (about 70%) who still smoke want to quit. Here are some of the best ways to stop smoking.     KEEP TRYING:  It takes most smokers about 8 tries before they are finally able to fully quit. So, the more often you try and fail, the better your chance of quitting the next time! So, don't give up!    GO COLD TURKEY:  Most ex-smokers quit cold turkey. Trying to cut back gradually doesn't seem to work as well, perhaps because it continues the smoking habit. Also, it is possible to fool yourself by inhaling more while smoking fewer cigarettes. This results in the same amount of nicotine in your body!    GET SUPPORT:  Support programs can make an important difference, especially for the heavy smoker. These groups offer lectures, methods to change your behavior and peer support. Call the free national Quitline for more information. 800-QUIT-NOW (027-862-2979). Low-cost or free programs are offered by many hospitals, local chapters of the American Lung Association (853-748-8076) and the American Cancer Society (653-422-6354). Support at home is important too. Non-smokers can help by offering praise and encouragement. If the smoker fails to quit, encourage them to try again!    OVER-THE-COUNTER MEDICINES:  For those who can't quit on their own, Nicotine Replacement Therapy (NRT) may make quitting much easier. Certain aids such  as the nicotine patch, gum and lozenge are available without a prescription. However, it is best to use these under the guidance of your doctor. The skin patch provides a steady supply of nicotine to the body. Nicotine gum and lozenge gives temporary bursts of low levels of nicotine. Both methods take the edge off the craving for cigarettes. WARNING: If you feel symptoms of nicotine overdose, such as nausea, vomiting, dizziness, weakness, or fast heartbeat, stop using these and see your doctor.    PRESCRIPTION MEDICINES:  After evaluating your smoking patterns and prior attempts at quitting, your doctor may offer a prescription medicine such as bupropion (Zyban, Wellbutrin), varenicline (Chantix, Champix), a niocotine inhaler or nasal spray. Each has its unique advantage and side effects which your doctor can review with you.    HEALTH BENEFITS OF QUITTING:  The benefits of quitting start right away and keep improving the longer you go without smokin minutes: blood pressure and pulse return to normal  8 hours: oxygen levels return to normal  2 days: ability to smell and taste begins to improve as damaged nerves start to regrow  2-3 weeks: circulation and lung function improves  1-9 months: decreased cough, congestion and shortness of breath; less tired  1 year: risk of heart attack decreases by half  5 years: risk of lung cancer decreases by half; risk of stroke becomes the same as a non-smoker  For information about how to quit smoking, visit the following links:  National Cancer [place] ,   Clearing the Air, Quit Smoking Today   - an online booklet. http://www.smokefree.gov/pubs/clearing_the_air.pdf  Smokefree.gov http://smokefree.gov/  QuitNet http://www.quitnet.com/    6111-7551 Sangeetha Mathews, [address] [place] [address], [place] [address]. All rights reserved. This information is not intended as a substitute for professional medical care. Always follow your healthcare professional's instructions.    The  Benefits of Living Smoke Free  What do you want to gain from quitting? Check off some reasons to quit.  Health Benefits  ___ Reduce my risk of lung cancer, heart disease, chronic lung disease  ___ Have fewer wrinkles and softer skin  ___ Improve my sense of taste and smell  ___ For pregnant women--reduce the risk of having a miscarriage, stillbirth, premature birth, or low-birth-weight baby  Personal Benefits  ___ Feel more in control of my life  ___ Have better-smelling hair, breath, clothes, home, and car  ___ Save time by not having to take smoke breaks, buy cigarettes, or hunt for a light  ___ Have whiter teeth  Family Benefits  ___ Reduce my children s respiratory tract infections  ___ Set a good example for my children  ___ Reduce my family s cancer risk  Financial Benefits  ___ Save hundreds of dollars each year that would be spent on cigarettes  ___ Save money on medical bills  ___ Save on life, health, and car insurance premiums    Those Dollars Add Up!  Cigarettes are expensive, and getting more expensive all the time. Do you realize how much money you are spending on cigarettes per year? What is the average amount you spend on a pack of cigarettes? What is the average number of packs that you smoke per day? Using your answers to these questions, fill in this formula to help you find out:  ($ _____ per pack) ×  ( _____ number of packs per day) × (365 days) =  $ _____ yearly cost of smoking  Besides tobacco, there are other costs, including extra cleaning bills and replacement costs for clothing and furniture; medical expenses for smoking-related illnesses; and higher health, life, and car insurance premiums.    Cigars and Pipes Count Too!  Cigars and pipes are also dangerous. So are smokeless (chewing) tobacco and snuff. All of these products contain nicotine, a highly addictive substance that has harmful effects on your body. Quitting smoking means giving up all tobacco products.      9074-0301 Sangeetha  "[employer], [address] [place] [address]. All rights reserved. This information is not intended as a substitute for professional medical care. Always follow your healthcare professional's instructions.  We will call with the labs.            Follow-ups after your visit        Who to contact     If you have questions or need follow up information about today's clinic visit or your schedule please contact [facility] directly at [phone].  Normal or non-critical lab and imaging results will be communicated to you by gDecidehart, letter or phone within 4 business days after the clinic has received the results. If you do not hear from us within 7 days, please contact the clinic through gDecidehart or phone. If you have a critical or abnormal lab result, we will notify you by phone as soon as possible.  Submit refill requests through [employer] or call your pharmacy and they will forward the refill request to us. Please allow 3 business days for your refill to be completed.          Additional Information About Your Visit        [employer] Information     [employer] lets you send messages to your doctor, view your test results, renew your prescriptions, schedule appointments and more. To sign up, go to [email or web address] . Click on \"Log in\" on the left side of the screen, which will take you to the Welcome page. Then click on \"Sign up Now\" on the right side of the page.     You will be asked to enter the access code listed below, as well as some personal information. Please follow the directions to create your username and password.     Your access code is: [email or web address]  Expires: 2017  6:47 AM     Your access code will  in 90 days. If you need help or a new code, please call your [facility] or [phone].        Care EveryWhere ID     This is your Care EveryWhere ID. This could be used by other organizations to access your [facility] medical records  [id number]        Your Vitals Were  " "   Pulse Temperature Height Pulse Oximetry BMI (Body Mass Index)       119 98.7  F (37.1  C) (Tympanic) 5' 10\" (1.778 m) 99% 24.82 kg/m2        Blood Pressure from Last 3 Encounters:   12/18/17 136/74   12/07/17 146/95   12/04/17 154/78    Weight from Last 3 Encounters:   12/18/17 173 lb (78.5 kg)   12/04/17 173 lb (78.5 kg)   04/06/17 170 lb (77.1 kg)              We Performed the Following     Basic metabolic panel     Tobacco Cessation - Order to Satisfy Health Maintenance     US CAITLIN Doppler No Exercise 1-2 Levels Bilateral        Primary Care Provider Office Phone # Fax #    R Stephon Garcia [phone]       [address] [address]        Equal Access to Services     [facility]BHASH : Hadii bony funez hadasho Soomaali, waaxda luqadaha, qaybta kaalmada adeegyada, laurel ordoñez hayagustin singh lapooja . So [facility] [phone].    ATENCIÓN: Si habla español, tiene a bashir disposición servicios gratuitos de asistencia lingüística. Liz al [phone].    We comply with applicable federal civil rights laws and [place] laws. We do not discriminate on the basis of race, color, national origin, age, disability, sex, sexual orientation, or gender identity.            Thank you!     Thank you for choosing [facility]  for your care. Our goal is always to provide you with excellent care. Hearing back from our patients is one way we can continue to improve our services. Please take a few minutes to complete the written survey that you may receive in the mail after your visit with us. Thank you!             Your Updated Medication List - Protect others around you: Learn how to safely use, store and throw away your medicines at www.disposemymeds.org.          This list is accurate as of: 12/18/17  9:46 AM.  Always use your most recent med list.                   Brand Name Dispense Instructions for use Diagnosis    clopidogrel 75 MG tablet    PLAVIX    90 tablet "    Take 1 tablet (75 mg) by mouth daily    History of heart artery stent       folic acid 1 MG tablet    FOLVITE    30 tablet    Take 1 tablet (1 mg) by mouth daily    ETOH abuse       gabapentin 300 MG capsule    NEURONTIN    120 capsule    Take 1 capsule (300 mg) by mouth 4 times daily    Neuropathy       lisinopril 20 MG tablet    PRINIVIL/ZESTRIL    90 tablet    Take 1 tablet (20 mg) by mouth daily    History of heart artery stent       magnesium oxide 400 (241.3 MG) MG tablet    MAG-OX    90 tablet    Take 1 tablet (400 mg) by mouth daily    Hypomagnesemia       metoprolol 25 MG 24 hr tablet    TOPROL-XL    90 tablet    Take 1 tablet (25 mg) by mouth daily    History of heart artery stent       multivitamin, therapeutic with minerals Tabs tablet     30 each    Take 1 tablet by mouth daily    Alcoholic ketoacidosis, ETOH abuse       nitroGLYcerin 0.4 MG sublingual tablet    NITROSTAT    10 tablet    Place 1 tablet (0.4 mg) under the tongue every 5 minutes as needed for chest pain At the 1st sign of attack; may repeat every 5 min until relief; if pain persists after 3 tablets in 15 min, prompt medial attention is recommended AS NEEDED        simvastatin 5 MG tablet    ZOCOR    90 tablet    Take 2 tablets (10 mg) by mouth daily    History of myocardial infarction       tamsulosin 0.4 MG capsule    FLOMAX    90 capsule    Take 1 capsule (0.4 mg) by mouth daily    Urinary frequency       thiamine 100 MG tablet     30 tablet    Take 1 tablet (100 mg) by mouth daily    ETOH abuse

## 2017-12-18 NOTE — NURSING NOTE
"Chief Complaint   Patient presents with     ER F/U       Initial /74 (BP Location: Left arm, Patient Position: Chair, Cuff Size: Adult Regular)  Pulse 119  Temp 98.7  F (37.1  C) (Tympanic)  Ht 5' 10\" (1.778 m)  Wt 173 lb (78.5 kg)  SpO2 99%  BMI 24.82 kg/m2 Estimated body mass index is 24.82 kg/(m^2) as calculated from the following:    Height as of this encounter: 5' 10\" (1.778 m).    Weight as of this encounter: 173 lb (78.5 kg).  Medication Reconciliation: complete     Kareen Roberts     "

## 2017-12-18 NOTE — PATIENT INSTRUCTIONS

## 2017-12-19 ASSESSMENT — ANXIETY QUESTIONNAIRES: GAD7 TOTAL SCORE: 16

## 2018-02-07 ENCOUNTER — TELEPHONE (OUTPATIENT)
Dept: FAMILY MEDICINE | Facility: OTHER | Age: 63
End: 2018-02-07

## 2018-02-07 NOTE — TELEPHONE ENCOUNTER
Left message for the patient to return phone call back to clinic at earliest convenience.  Tabby Lancaster CMA([facility])

## 2018-02-07 NOTE — TELEPHONE ENCOUNTER
8:55 AM    Reason for Call: Phone Call    Description: Pt called and states that he got a letter in the mail about a colonoscopy  And he would like a call back regarding this and why he got this     Was an appointment offered for this call? No  If yes : Appointment type              Date    Preferred method for responding to this message: Telephone Call  What is your phone number ?    If we cannot reach you directly, may we leave a detailed response at the number you provided? Yes    Can this message wait until your PCP/provider returns, if available today? Not applicable, PCP is in     Ely Bridgman

## 2018-02-12 NOTE — TELEPHONE ENCOUNTER
Left message for the patient to return phone call back to clinic at earliest convenience.  Tabby Lancaster CMA([facility])

## 2018-05-20 ENCOUNTER — APPOINTMENT (OUTPATIENT)
Dept: CT IMAGING | Facility: HOSPITAL | Age: 63
End: 2018-05-20
Attending: PHYSICIAN ASSISTANT
Payer: COMMERCIAL

## 2018-05-20 ENCOUNTER — HOSPITAL ENCOUNTER (EMERGENCY)
Facility: HOSPITAL | Age: 63
Discharge: HOME | End: 2018-05-20
Attending: PHYSICIAN ASSISTANT | Admitting: PHYSICIAN ASSISTANT
Payer: COMMERCIAL

## 2018-05-20 ENCOUNTER — APPOINTMENT (OUTPATIENT)
Dept: GENERAL RADIOLOGY | Facility: HOSPITAL | Age: 63
End: 2018-05-20
Attending: PHYSICIAN ASSISTANT
Payer: COMMERCIAL

## 2018-05-20 VITALS
OXYGEN SATURATION: 97 % | SYSTOLIC BLOOD PRESSURE: 181 MMHG | DIASTOLIC BLOOD PRESSURE: 105 MMHG | RESPIRATION RATE: 20 BRPM | TEMPERATURE: 97.6 F

## 2018-05-20 DIAGNOSIS — M25.561 ACUTE PAIN OF BOTH KNEES: ICD-10-CM
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
## 2018-05-20 DIAGNOSIS — M25.562 ACUTE PAIN OF BOTH KNEES: ICD-10-CM

## 2018-05-20 LAB
ALBUMIN SERPL-MCNC: 3.4 G/DL (ref 3.4–5)
ALP SERPL-CCNC: 133 U/L (ref 40–150)
ALT SERPL W P-5'-P-CCNC: 40 U/L (ref 0–70)
ANION GAP SERPL CALCULATED.3IONS-SCNC: 13 MMOL/L (ref 3–14)
AST SERPL W P-5'-P-CCNC: 66 U/L (ref 0–45)
BILIRUB SERPL-MCNC: 0.5 MG/DL (ref 0.2–1.3)
BUN SERPL-MCNC: 6 MG/DL (ref 7–30)
CALCIUM SERPL-MCNC: 8.1 MG/DL (ref 8.5–10.1)
CHLORIDE SERPL-SCNC: 103 MMOL/L (ref 94–109)
CK SERPL-CCNC: 563 U/L (ref 30–300)
CO2 SERPL-SCNC: 25 MMOL/L (ref 20–32)
CREAT SERPL-MCNC: 0.54 MG/DL (ref 0.66–1.25)
ERYTHROCYTE [DISTWIDTH] IN BLOOD BY AUTOMATED COUNT: 14.8 % (ref 10–15)
ETHANOL SERPL-MCNC: 0.37 G/DL
GFR SERPL CREATININE-BSD FRML MDRD: >90 ML/MIN/1.7M2
GLUCOSE SERPL-MCNC: 114 MG/DL (ref 70–99)
HCT VFR BLD AUTO: 46.6 % (ref 40–53)
HGB BLD-MCNC: 16.5 G/DL (ref 13.3–17.7)
MCH RBC QN AUTO: 36.3 PG (ref 26.5–33)
MCHC RBC AUTO-ENTMCNC: 35.4 G/DL (ref 31.5–36.5)
MCV RBC AUTO: 103 FL (ref 78–100)
PLATELET # BLD AUTO: 260 10E9/L (ref 150–450)
POTASSIUM SERPL-SCNC: 3.2 MMOL/L (ref 3.4–5.3)
PROT SERPL-MCNC: 7.6 G/DL (ref 6.8–8.8)
RBC # BLD AUTO: 4.54 10E12/L (ref 4.4–5.9)
SODIUM SERPL-SCNC: 141 MMOL/L (ref 133–144)
WBC # BLD AUTO: 8.2 10E9/L (ref 4–11)

## 2018-05-20 PROCEDURE — 99284 EMERGENCY DEPT VISIT MOD MDM: CPT | Performed by: PHYSICIAN ASSISTANT

## 2018-05-20 PROCEDURE — 82550 ASSAY OF CK (CPK): CPT | Performed by: PHYSICIAN ASSISTANT

## 2018-05-20 PROCEDURE — 73562 X-RAY EXAM OF KNEE 3: CPT | Mod: 50

## 2018-05-20 PROCEDURE — 99284 EMERGENCY DEPT VISIT MOD MDM: CPT | Mod: 25

## 2018-05-20 PROCEDURE — 85027 COMPLETE CBC AUTOMATED: CPT | Performed by: PHYSICIAN ASSISTANT

## 2018-05-20 PROCEDURE — 80053 COMPREHEN METABOLIC PANEL: CPT | Performed by: PHYSICIAN ASSISTANT

## 2018-05-20 PROCEDURE — 80320 DRUG SCREEN QUANTALCOHOLS: CPT | Performed by: PHYSICIAN ASSISTANT

## 2018-05-20 PROCEDURE — 36415 COLL VENOUS BLD VENIPUNCTURE: CPT | Performed by: PHYSICIAN ASSISTANT

## 2018-05-20 PROCEDURE — 70450 CT HEAD/BRAIN W/O DYE: CPT | Mod: TC

## 2018-05-20 ASSESSMENT — ENCOUNTER SYMPTOMS
CARDIOVASCULAR NEGATIVE: 1
LIGHT-HEADEDNESS: 0
RESPIRATORY NEGATIVE: 1
PSYCHIATRIC NEGATIVE: 1
CONSTITUTIONAL NEGATIVE: 1
HEADACHES: 0

## 2018-05-20 NOTE — LETTER
[facility]  [address]  [place] [address]  Phone: [phone]    05/21/18    Andrew Virgen  [address]  [place] [address]      Dear Shant:     Talat Otero, Physician Assistant asked for a follow up to be made to you.  I did try to call, however, your phone is not operational.  I would like to assist you in trying to obtain more care if possible.      You may give me a call at [phone].      Kind Regards,      Kelsea Irwin, MSW, LICSW

## 2018-05-20 NOTE — DISCHARGE INSTRUCTIONS
- Rest, ice, compression, elevation    - Topicals: Arnica Cream (5$ at [employer]) and/or Capsaicin. (1/4 teaspoon cayenne pepper in a palmful olive oil and rub in 2-3 times daily for 5-7 days for pain)    * Any worsening after 3 days should be rechecked - especially any giving out. See Dr Garcia

## 2018-05-20 NOTE — ED NOTES
"Pt brought to ER by friend, for c/o \"falling and hitting his head.\" Pt unable to recall events inreference to the fall, denies neck or head pain. Trauma eval was called and evaluated by , indicated no trauma to be paged overhead. Pt refusing to change into gown and get out of w/c, \"I'm fine, right where I am, I just want to go home. See assessments. Call light placed within reach.   "

## 2018-05-20 NOTE — ED NOTES
Received call from roya Larkin stating that his left knee hurts as well, provider made aware, see new orders.

## 2018-05-20 NOTE — ED AVS SNAPSHOT
[facility]    [address] [address]    Phone:  [phone]                                       Andrew Virgen   MRN: [id number]    Department:  [facility]   Date of Visit:  5/20/2018           After Visit Summary Signature Page     I have received my discharge instructions, and my questions have been answered. I have discussed any challenges I see with this plan with the nurse or doctor.    ..........................................................................................................................................  Patient/Patient Representative Signature      ..........................................................................................................................................  Patient Representative Print Name and Relationship to Patient    ..................................................               ................................................  Date                                            Time    ..........................................................................................................................................  Reviewed by Signature/Title    ...................................................              ..............................................  Date                                                            Time

## 2018-05-20 NOTE — ED NOTES
Reviewed discharge instructions with pt, verbalized understanding. Did encourage f/u and to rest extremities and elevate with ice. Verbalized understanding. Copy of discharge instructions provided. Did verbalize to writer that he may start needing some help at home, informed him, would have the  contact him tomorrow. Note sent to .

## 2018-05-20 NOTE — ED AVS SNAPSHOT
[facility]    [address] [address]    Phone:  [phone]                                       Andrew Virgen   MRN: [id number]    Department:  [facility]   Date of Visit:  5/20/2018           Patient Information     Date Of Birth          1955        Your diagnoses for this visit were:     Fall, initial encounter     Acute pain of both knees        You were seen by Talat Otero PA.      Follow-up Information     Follow up with SUBHASH Garcia MD In 1 week.    Specialty:  Family Practice    Contact information:    [address] [facility] [address] [phone]          Follow up with [facility].    Specialty:  EMERGENCY MEDICINE    Why:  If symptoms worsen    Contact information:    [address] [address] [phone]    Additional information:    [facility]: Take [address]. Turn left at [address]. Turn left at the first stoplight on [address]. At the first stop sign, take a right onto [address]. Take a left into the parking lot and continue through until you reach the North enterance of the building.       From [place]: Take [address]. Take the [address] ramp towards [place]. Turn left onto [address]. Take a slight right onto [address]. Turn left at the first stoplight on [address]. At the first stop sign, take a right onto [address]. Take a left into the parking lot and continue through until you reach the North enterance of the building.       From [place]: Take [address]. Take a right at [address]. At the first stop sign, take a right onto [address]. Take a left into the parking lot and continue through until you reach the North enterance of the building.         Discharge Instructions       - Rest, ice, compression, elevation    - Topicals: Arnica Cream (5$ at [employer]) and/or Capsaicin. (1/4  teaspoon cayenne pepper in a palmful olive oil and rub in 2-3 times daily for 5-7 days for pain)    * Any worsening after 3 days should be rechecked - especially any giving out. See Dr Garcia    Discharge References/Attachments     FALLS, PREVENTING, STAYING ACTIVE (ENGLISH)         Review of your medicines      Our records show that you are taking the medicines listed below. If these are incorrect, please call your family doctor or clinic.        Dose / Directions Last dose taken    clopidogrel 75 MG tablet   Commonly known as:  PLAVIX   Dose:  75 mg   Quantity:  90 tablet        Take 1 tablet (75 mg) by mouth daily   Refills:  1        folic acid 1 MG tablet   Commonly known as:  FOLVITE   Dose:  1 mg   Quantity:  30 tablet        Take 1 tablet (1 mg) by mouth daily   Refills:  0        gabapentin 300 MG capsule   Commonly known as:  NEURONTIN   Dose:  300 mg   Quantity:  120 capsule        Take 1 capsule (300 mg) by mouth 4 times daily   Refills:  1        lisinopril 20 MG tablet   Commonly known as:  PRINIVIL/ZESTRIL   Dose:  20 mg   Quantity:  90 tablet        Take 1 tablet (20 mg) by mouth daily   Refills:  1        magnesium oxide 400 (241.3 Mg) MG tablet   Commonly known as:  MAG-OX   Dose:  400 mg   Quantity:  90 tablet        Take 1 tablet (400 mg) by mouth daily   Refills:  1        metoprolol succinate 25 MG 24 hr tablet   Commonly known as:  TOPROL-XL   Dose:  25 mg   Quantity:  90 tablet        Take 1 tablet (25 mg) by mouth daily   Refills:  1        multivitamin, therapeutic with minerals Tabs tablet   Dose:  1 tablet   Quantity:  30 each        Take 1 tablet by mouth daily   Refills:  0        nitroGLYcerin 0.4 MG sublingual tablet   Commonly known as:  NITROSTAT   Dose:  0.4 mg   Quantity:  10 tablet        Place 1 tablet (0.4 mg) under the tongue every 5 minutes as needed for chest pain At the 1st sign of attack; may repeat every 5 min until relief; if pain persists after 3 tablets in 15 min,  prompt medial attention is recommended AS NEEDED   Refills:  0        simvastatin 5 MG tablet   Commonly known as:  ZOCOR   Dose:  10 mg   Quantity:  90 tablet        Take 2 tablets (10 mg) by mouth daily   Refills:  1        tamsulosin 0.4 MG capsule   Commonly known as:  FLOMAX   Dose:  0.4 mg   Quantity:  90 capsule        Take 1 capsule (0.4 mg) by mouth daily   Refills:  1        thiamine 100 MG tablet   Dose:  100 mg   Quantity:  30 tablet        Take 1 tablet (100 mg) by mouth daily   Refills:  0                Procedures and tests performed during your visit     Alcohol ethyl    CBC with platelets    CK total    CT Head w/o Contrast    Comprehensive metabolic panel    XR Knee Left 3 Views    XR Knee Right 3 Views      Orders Needing Specimen Collection     Ordered          05/20/18 1336  UA reflex to Microscopic - STAT, Prio: STAT, Status: Sent     Scheduled Task Status   05/20/18 1336 STATS Group CC Reminder: Open   Order Class:  PCU Collect                05/20/18 1336  Drug Screen Urine (Range) - STAT, Prio: STAT, Status: Sent     Scheduled Task Status   05/20/18 1336 STATS Group CC Reminder: Open   Order Class:  PCU Collect                  Pending Results     Date and Time Order Name Status Description    5/20/2018 1513 Alcohol ethyl In process     5/20/2018 1429 XR Knee Left 3 Views In process             Pending Culture Results     No orders found from 5/18/2018 to 5/21/2018.            Thank you for choosing [facility]       Thank you for choosing [facility] for your care. Our goal is always to provide you with excellent care. Hearing back from our patients is one way we can continue to improve our services. Please take a few minutes to complete the written survey that you may receive in the mail after you visit with us. Thank you!        [employer] Information     [employer] lets you send messages to your doctor, view your test results, renew your prescriptions, schedule appointments and more. To sign up, go to  [email or web address] . Click on \"Log in\" on the left side of the screen, which will take you to the Welcome page. Then click on \"Sign up Now\" on the right side of the page.     You will be asked to enter the access code listed below, as well as some personal information. Please follow the directions to create your username and password.     Your access code is: [email or web address]  Expires: 2018  3:28 PM     Your access code will  in 90 days. If you need help or a new code, please call your [place] clinic or [phone].        Care EveryWhere ID     This is your Care EveryWhere ID. This could be used by other organizations to access your [facility] medical records  [id number]        Equal Access to Services     SHAYAN MERCEDES : Sara Heredia, keo viveros, juan c hobsonalcandice vee, laurel angulo. So [facility] [phone].    ATENCIÓN: Si habla español, tiene a bashir disposición servicios gratuitos de asistencia lingüística. Liz al [phone].    We comply with applicable federal civil rights laws and [place] laws. We do not discriminate on the basis of race, color, national origin, age, disability, sex, sexual orientation, or gender identity.            After Visit Summary       This is your record. Keep this with you and show to your community pharmacist(s) and doctor(s) at your next visit.                  "

## 2018-05-20 NOTE — ED PROVIDER NOTES
History     Chief Complaint   Patient presents with     Fall     c/o fell and hit his head on a door jam, uncertain about loss of consciousness. notes etoh today. c/o bilateral leg pain and head pain     HPI  Andrew Virgen is a 62 year old male well known to this facility, with Hx CAD and alcohol abuse, who presents after a fall. He admits to using alcohol today. He reports he fell and his his head on the door jamb. He is unsure about the details afterward. He has right>left knee pain, but no abrasion or swelling. He tells me he just wanted his pupils checked out so he could go home. After evaluation and some discussion, he is willing to stay for imaging. He reports feeling well otherwise.     Problem List:    Patient Active Problem List    Diagnosis Date Noted     Hypercholesterolemia 12/04/2017     Priority: Medium     History of myocardial infarction 12/04/2017     Priority: Medium     Acidosis - suspect alcoholic ketoacidosis 09/19/2016     Priority: Medium     Hypokalemia 09/19/2016     Priority: Medium     Hypomagnesemia 09/19/2016     Priority: Medium     ETOH abuse 09/19/2016     Priority: Medium     Possible - Urinary tract infection  09/19/2016     Priority: Medium     Alcoholic ketoacidosis 09/19/2016     Priority: Medium     Acute alcohol intoxication, uncomplicated (H) 03/15/2016     Priority: Medium     Chest pain of uncertain etiology 03/15/2016     Priority: Medium        Past Medical History:    Past Medical History:   Diagnosis Date     Acute myocardial infarction of other specified sit 3/10/2011     Hypertension, benign 3/10/2011     Osteoarthrosis, unspecified whether generalized or  3/10/2011     Other and unspecified alcohol dependence, unspecif 3/10/2011       Past Surgical History:    Past Surgical History:   Procedure Laterality Date     heart surgery -1 stent  3/2009     pins in foot      fractures - right       Family History:    Family History   Problem Relation Age of Onset      C.A.D. Brother 36     cause of death     DIABETES Brother      DIABETES Brother      HEART DISEASE Brother 39     heart disease - cause of death     Hypertension Father      MENTAL ILLNESS Brother        Social History:  Marital Status:  Single [1]  Social History   Substance Use Topics     Smoking status: Current Every Day Smoker     Packs/day: 0.50     Years: 39.00     Types: Cigarettes     Smokeless tobacco: Never Used      Comment: tried to quit - longest period tobacco free: 6 months - passive smoke exposure     Alcohol use Yes      Comment: vodka 1/2 L daily        Medications:      clopidogrel (PLAVIX) 75 MG tablet   folic acid (FOLVITE) 1 MG tablet   gabapentin (NEURONTIN) 300 MG capsule   lisinopril (PRINIVIL/ZESTRIL) 20 MG tablet   magnesium oxide (MAG-OX) 400 (241.3 MG) MG tablet   metoprolol (TOPROL-XL) 25 MG 24 hr tablet   multivitamin, therapeutic with minerals (THERA-VIT-M) TABS   simvastatin (ZOCOR) 5 MG tablet   tamsulosin (FLOMAX) 0.4 MG capsule   thiamine 100 MG tablet   nitroglycerin (NITROSTAT) 0.4 MG SL tablet         Review of Systems   Constitutional: Negative.    Respiratory: Negative.    Cardiovascular: Negative.    Skin: Negative.    Neurological: Positive for syncope (unsure of LOC). Negative for light-headedness and headaches.   Psychiatric/Behavioral: Negative.        Physical Exam   BP: 154/88  Heart Rate: 97  Temp: 98.4  F (36.9  C)  Resp: 16  SpO2: 97 %      Physical Exam   Constitutional: He is oriented to person, place, and time. He appears well-developed and well-nourished. No distress (talkative, smells of alcohol).   HENT:   Head: Normocephalic and atraumatic.   Right Ear: External ear normal.   Left Ear: External ear normal.   Mouth/Throat: Oropharynx is clear and moist.   Eyes: Conjunctivae and EOM are normal. Pupils are equal, round, and reactive to light.   Neck: Normal range of motion. Neck supple. No spinous process tenderness and no muscular tenderness present. Normal  range of motion present.   Cardiovascular: Normal rate and normal heart sounds.    Pulmonary/Chest: Effort normal and breath sounds normal.   NO tenderness of chest wall   Abdominal: Soft. Bowel sounds are normal. There is no tenderness.   Musculoskeletal:        Right knee: He exhibits swelling (mild). He exhibits normal range of motion, no deformity, no laceration, no erythema, no LCL laxity and no MCL laxity. Tenderness found. Patellar tendon (anteriorly) tenderness noted.        Left knee: He exhibits swelling. He exhibits normal range of motion. Tenderness (anterior) found.   No tenderness of pelvis   Neurological: He is alert and oriented to person, place, and time.   Skin: Skin is warm and dry.   Psychiatric: He has a normal mood and affect.   Nursing note and vitals reviewed.      ED Course     ED Course     Procedures    Results for orders placed or performed during the hospital encounter of 05/20/18 (from the past 24 hour(s))   CBC with platelets   Result Value Ref Range    WBC 8.2 4.0 - 11.0 10e9/L    RBC Count 4.54 4.4 - 5.9 10e12/L    Hemoglobin 16.5 13.3 - 17.7 g/dL    Hematocrit 46.6 40.0 - 53.0 %     (H) 78 - 100 fl    MCH 36.3 (H) 26.5 - 33.0 pg    MCHC 35.4 31.5 - 36.5 g/dL    RDW 14.8 10.0 - 15.0 %    Platelet Count 260 150 - 450 10e9/L   CK total   Result Value Ref Range    CK Total 563 (H) 30 - 300 U/L   Comprehensive metabolic panel   Result Value Ref Range    Sodium 141 133 - 144 mmol/L    Potassium 3.2 (L) 3.4 - 5.3 mmol/L    Chloride 103 94 - 109 mmol/L    Carbon Dioxide 25 20 - 32 mmol/L    Anion Gap 13 3 - 14 mmol/L    Glucose 114 (H) 70 - 99 mg/dL    Urea Nitrogen 6 (L) 7 - 30 mg/dL    Creatinine 0.54 (L) 0.66 - 1.25 mg/dL    GFR Estimate >90 >60 mL/min/1.7m2    GFR Estimate If Black >90 >60 mL/min/1.7m2    Calcium 8.1 (L) 8.5 - 10.1 mg/dL    Bilirubin Total 0.5 0.2 - 1.3 mg/dL    Albumin 3.4 3.4 - 5.0 g/dL    Protein Total 7.6 6.8 - 8.8 g/dL    Alkaline Phosphatase 133 40 - 150  U/L    ALT 40 0 - 70 U/L    AST 66 (H) 0 - 45 U/L   Alcohol ethyl   Result Value Ref Range    Ethanol g/dL 0.37 (H) 0.01 g/dL   CT Head w/o Contrast    Narrative    PROCEDURE: CT HEAD W/O CONTRAST     HISTORY: fall, head injury; .    COMPARISON: January 24, 2017    TECHNIQUE:  Helical images of the head from the foramen magnum to the  vertex were obtained without contrast.    FINDINGS: There is some widening of the cortical sulci consistent with  atrophy. The ventricular system is normal in size. There are no masses  or ventricular shifts. There is some widening of the cerebellar folia.  The brainstem appears normal. The cranial vault is intact. No abnormal  fluid is seen in the middle ear cavity or mastoids.  The visualized  paranasal sinuses are clear.      Impression    IMPRESSION: Atrophy no acute brain injury      DEBRA CAMERON MD   XR Knee Right 3 Views    Narrative    PROCEDURE:  XR KNEE RT 3 VW    HISTORY: fall, pain;     COMPARISON:  None.    TECHNIQUE:  3 views of the both knees were obtained.    FINDINGS:  The distal femur patella and proximal tibia and fibula  appear normal bilaterally. The articular spaces are normal in height.  No knee effusion is seen.       Impression    IMPRESSION: Normal knees      DEBRA CAMERON MD       Medications - No data to display    Assessments & Plan (with Medical Decision Making)     I have reviewed the nursing notes.  I have reviewed the findings, diagnosis, plan and need for follow up with the patient.    Discharge Medication List as of 5/20/2018  3:28 PM          Final diagnoses:   Fall, initial encounter   Acute pain of both knees   Andrew is indeed intoxicated today. Imaging reveals normal brain and normal knees. He does have some mild bilateral swelling, no gross effusion and no abrasion. ACE applied to left knee. I advised Andrew to RICE and f/u with PCP in 3-7 days if poor progression. He is to recheck here or UC sooner with concerns. He is agreeable to plan  and discharged home via taxi service.     5/20/2018   [facility]     Talat Otero, PA  05/20/18 1025

## 2018-05-21 ENCOUNTER — TELEPHONE (OUTPATIENT)
Dept: CASE MANAGEMENT | Facility: HOSPITAL | Age: 63
End: 2018-05-21

## 2018-05-21 NOTE — TELEPHONE ENCOUNTER
Received a referral to check in with this patient.  He is not reachable by phone.  Letter to be sent.

## 2019-01-01 ENCOUNTER — ANESTHESIA (OUTPATIENT)
Dept: EMERGENCY MEDICINE | Facility: HOSPITAL | Age: 64
End: 2019-01-01
Payer: COMMERCIAL

## 2019-01-01 ENCOUNTER — APPOINTMENT (OUTPATIENT)
Dept: GENERAL RADIOLOGY | Facility: HOSPITAL | Age: 64
End: 2019-01-01
Attending: FAMILY MEDICINE
Payer: COMMERCIAL

## 2019-01-01 ENCOUNTER — HOSPITAL ENCOUNTER (EMERGENCY)
Facility: HOSPITAL | Age: 64
Discharge: ANOTHER HOSPITAL | End: 2019-06-02
Attending: FAMILY MEDICINE | Admitting: FAMILY MEDICINE
Payer: COMMERCIAL

## 2019-01-01 ENCOUNTER — ANESTHESIA EVENT (OUTPATIENT)
Dept: EMERGENCY MEDICINE | Facility: HOSPITAL | Age: 64
End: 2019-01-01
Payer: COMMERCIAL

## 2019-01-01 VITALS
SYSTOLIC BLOOD PRESSURE: 116 MMHG | OXYGEN SATURATION: 96 % | BODY MASS INDEX: 26.89 KG/M2 | HEART RATE: 75 BPM | RESPIRATION RATE: 22 BRPM | DIASTOLIC BLOOD PRESSURE: 78 MMHG | WEIGHT: 187.39 LBS

## 2019-01-01 DIAGNOSIS — K92.2 ACUTE UPPER GI BLEED: ICD-10-CM

## 2019-01-01 DIAGNOSIS — I46.9 CARDIAC ARREST (H): ICD-10-CM

## 2019-01-01 LAB
ABO + RH BLD: NORMAL
ABO + RH BLD: NORMAL
ALBUMIN SERPL-MCNC: 1.5 G/DL (ref 3.4–5)
ALP SERPL-CCNC: 115 U/L (ref 40–150)
ALT SERPL W P-5'-P-CCNC: 42 U/L (ref 0–70)
ANION GAP SERPL CALCULATED.3IONS-SCNC: 39 MMOL/L (ref 3–14)
AST SERPL W P-5'-P-CCNC: 205 U/L (ref 0–45)
BILIRUB SERPL-MCNC: 1.3 MG/DL (ref 0.2–1.3)
BLD GP AB SCN SERPL QL: NORMAL
BLD PROD TYP BPU: NORMAL
BLD PROD TYP BPU: NORMAL
BLD UNIT ID BPU: 0
BLD UNIT ID BPU: 0
BLOOD BANK CMNT PATIENT-IMP: NORMAL
BLOOD BANK CMNT PATIENT-IMP: NORMAL
BLOOD PRODUCT CODE: NORMAL
BLOOD PRODUCT CODE: NORMAL
BPU ID: NORMAL
BPU ID: NORMAL
BUN SERPL-MCNC: 36 MG/DL (ref 7–30)
CALCIUM SERPL-MCNC: 8.4 MG/DL (ref 8.5–10.1)
CHLORIDE SERPL-SCNC: 93 MMOL/L (ref 94–109)
CO2 SERPL-SCNC: 7 MMOL/L (ref 20–32)
CREAT SERPL-MCNC: 1.41 MG/DL (ref 0.66–1.25)
GFR SERPL CREATININE-BSD FRML MDRD: 53 ML/MIN/{1.73_M2}
GLUCOSE SERPL-MCNC: 93 MG/DL (ref 70–99)
HCO3 BLD-SCNC: 4 MMOL/L (ref 21–28)
HGB BLD-MCNC: NORMAL G/DL (ref 13.3–17.7)
O2/TOTAL GAS SETTING VFR VENT: ABNORMAL %
OXYHGB MFR BLD: 96 % (ref 92–100)
PCO2 BLD: 39 MM HG (ref 35–45)
PH BLD: 6.54 PH (ref 7.35–7.45)
PO2 BLD: 239 MM HG (ref 80–105)
POTASSIUM SERPL-SCNC: 4.6 MMOL/L (ref 3.4–5.3)
PROT SERPL-MCNC: 3.9 G/DL (ref 6.8–8.8)
SODIUM SERPL-SCNC: 139 MMOL/L (ref 133–144)
SPECIMEN EXP DATE BLD: NORMAL
TRANSFUSION STATUS PATIENT QL: NORMAL
TROPONIN I SERPL-MCNC: 0.06 UG/L (ref 0–0.04)

## 2019-01-01 PROCEDURE — 82805 BLOOD GASES W/O2 SATURATION: CPT | Performed by: FAMILY MEDICINE

## 2019-01-01 PROCEDURE — 99291 CRITICAL CARE FIRST HOUR: CPT | Mod: 25

## 2019-01-01 PROCEDURE — 31500 INSERT EMERGENCY AIRWAY: CPT | Performed by: NURSE ANESTHETIST, CERTIFIED REGISTERED

## 2019-01-01 PROCEDURE — 86850 RBC ANTIBODY SCREEN: CPT | Performed by: FAMILY MEDICINE

## 2019-01-01 PROCEDURE — P9016 RBC LEUKOCYTES REDUCED: HCPCS | Performed by: FAMILY MEDICINE

## 2019-01-01 PROCEDURE — 36680 INSERT NEEDLE BONE CAVITY: CPT

## 2019-01-01 PROCEDURE — 99291 CRITICAL CARE FIRST HOUR: CPT | Mod: 25 | Performed by: FAMILY MEDICINE

## 2019-01-01 PROCEDURE — 86900 BLOOD TYPING SEROLOGIC ABO: CPT | Performed by: FAMILY MEDICINE

## 2019-01-01 PROCEDURE — 25000128 H RX IP 250 OP 636

## 2019-01-01 PROCEDURE — 85025 COMPLETE CBC W/AUTO DIFF WBC: CPT | Performed by: FAMILY MEDICINE

## 2019-01-01 PROCEDURE — 40000986 XR CHEST PORT 1 VW: Mod: TC

## 2019-01-01 PROCEDURE — 80053 COMPREHEN METABOLIC PANEL: CPT | Performed by: FAMILY MEDICINE

## 2019-01-01 PROCEDURE — 86901 BLOOD TYPING SEROLOGIC RH(D): CPT | Performed by: FAMILY MEDICINE

## 2019-01-01 PROCEDURE — 84484 ASSAY OF TROPONIN QUANT: CPT | Performed by: FAMILY MEDICINE

## 2019-01-01 PROCEDURE — 36430 TRANSFUSION BLD/BLD COMPNT: CPT | Mod: XU

## 2019-01-01 PROCEDURE — 93005 ELECTROCARDIOGRAM TRACING: CPT | Mod: XU

## 2019-01-01 PROCEDURE — 92950 HEART/LUNG RESUSCITATION CPR: CPT

## 2019-01-01 PROCEDURE — 99292 CRITICAL CARE ADDL 30 MIN: CPT

## 2019-01-01 PROCEDURE — 25000125 ZZHC RX 250

## 2019-01-01 PROCEDURE — 36415 COLL VENOUS BLD VENIPUNCTURE: CPT | Performed by: FAMILY MEDICINE

## 2019-01-01 PROCEDURE — 92950 HEART/LUNG RESUSCITATION CPR: CPT | Performed by: FAMILY MEDICINE

## 2019-01-01 PROCEDURE — 96375 TX/PRO/DX INJ NEW DRUG ADDON: CPT

## 2019-01-01 PROCEDURE — 36600 WITHDRAWAL OF ARTERIAL BLOOD: CPT

## 2019-01-01 PROCEDURE — 40000275 ZZH STATISTIC RCP TIME EA 10 MIN

## 2019-01-01 PROCEDURE — 96374 THER/PROPH/DIAG INJ IV PUSH: CPT | Mod: XU

## 2019-01-01 RX ORDER — TRANEXAMIC ACID 100 MG/ML
INJECTION, SOLUTION INTRAVENOUS
Status: DISCONTINUED
Start: 2019-01-01 | End: 2019-01-01 | Stop reason: HOSPADM

## 2019-01-01 RX ORDER — ALBUTEROL SULFATE 0.83 MG/ML
SOLUTION RESPIRATORY (INHALATION)
Status: DISCONTINUED
Start: 2019-01-01 | End: 2019-01-01 | Stop reason: HOSPADM

## 2019-06-02 NOTE — ED NOTES
Pt arrived via [facility], per report pt was found with unresponsive and no pulse, CPR was performed and ROSC was obtained prior arrival, pt arrived with IGEL in place, with copious amounts of blood from mouth, attempting to suction as able. Reported that neighbors reported that he hasn't been himself, and was hollering from his apartment prior to him being found, per report neighbors called 911. EMS did find him as stated unresponsive upon entering his home. Provider at bedside immediately upon arrival, pulse noted. Assessment as charted

## 2019-06-02 NOTE — ED NOTES
PCS has been in contact with alanna Mcgee multiple times, making aware of pt's condition and plan to transfer to [facility].

## 2019-06-02 NOTE — PROGRESS NOTES
Pt to ED with igel in place. Ventilated with ambu bag 100%. EtCo2 30-40 unable to obtain Spo2. Pt intubated per Anesthesia with 7.0 oral ETT secured 24 cm at lip. BBS equal CXR obtained, ETT retaped 23 cm at the lip, BBS equal.  Continued ventilation with ambu bag 100%. ETCO2 remained 30-40 ABG's obtained.

## 2019-06-03 NOTE — ED PROVIDER NOTES
History     Chief Complaint   Patient presents with     Cardiac Arrest     present post cardiac arrest with ROSC. unresponsive, intubated. IO lt lower leg.      HPI  Andrew Virgen is a 63 year old male who presented to the emergency room post cardiac arrest with ROSC obtained in the field.  His area was controlled with an I-gel supraglottic airway, and the patient was having bleeding around the airway.  They had been unable to get an NG tube down with the airway in place.  The patient was found down after having been heard to cry out by a neighbor, at the time EMS arrived he was unresponsive, apneic and pulseless.  Initial rhythm on their strip was PEA, this alternated with asystole.  ROSC was obtained after 3 rounds of epinephrine and some bicarb in the field, patient maintained his pulse until approximately 10 minutes after arrival in the emergency room.  At that time the rhythm was also PEA.    Allergies:  Allergies   Allergen Reactions     Cocaine Shortness Of Breath and Other (See Comments)     Respiratory distress     Codeine      From [facility] record.      Penicillins        Problem List:    Patient Active Problem List    Diagnosis Date Noted     Hypercholesterolemia 12/04/2017     Priority: Medium     History of myocardial infarction 12/04/2017     Priority: Medium     Acidosis - suspect alcoholic ketoacidosis 09/19/2016     Priority: Medium     Hypokalemia 09/19/2016     Priority: Medium     Hypomagnesemia 09/19/2016     Priority: Medium     ETOH abuse 09/19/2016     Priority: Medium     Possible - Urinary tract infection  09/19/2016     Priority: Medium     Alcoholic ketoacidosis 09/19/2016     Priority: Medium     Acute alcohol intoxication, uncomplicated (H) 03/15/2016     Priority: Medium     Chest pain of uncertain etiology 03/15/2016     Priority: Medium        Past Medical History:    Past Medical History:   Diagnosis Date     Acute myocardial infarction of other specified sit  3/10/2011     Hypertension, benign 3/10/2011     Osteoarthrosis, unspecified whether generalized or  3/10/2011     Other and unspecified alcohol dependence, unspecif 3/10/2011       Past Surgical History:    Past Surgical History:   Procedure Laterality Date     heart surgery -1 stent  3/2009     pins in foot      fractures - right       Family History:    Family History   Problem Relation Age of Onset     C.A.D. Brother 36        cause of death     Diabetes Brother      Diabetes Brother      Heart Disease Brother 39        heart disease - cause of death     Hypertension Father      Mental Illness Brother        Social History:  Marital Status:  Single [1]  Social History     Tobacco Use     Smoking status: Current Every Day Smoker     Packs/day: 0.50     Years: 39.00     Pack years: 19.50     Types: Cigarettes     Smokeless tobacco: Never Used     Tobacco comment: tried to quit - longest period tobacco free: 6 months - passive smoke exposure   Substance Use Topics     Alcohol use: Yes     Comment: vodka 1/2 L daily     Drug use: No        Medications:      clopidogrel (PLAVIX) 75 MG tablet   folic acid (FOLVITE) 1 MG tablet   gabapentin (NEURONTIN) 300 MG capsule   lisinopril (PRINIVIL/ZESTRIL) 20 MG tablet   magnesium oxide (MAG-OX) 400 (241.3 MG) MG tablet   metoprolol (TOPROL-XL) 25 MG 24 hr tablet   multivitamin, therapeutic with minerals (THERA-VIT-M) TABS   nitroglycerin (NITROSTAT) 0.4 MG SL tablet   simvastatin (ZOCOR) 5 MG tablet   tamsulosin (FLOMAX) 0.4 MG capsule   thiamine 100 MG tablet         Review of Systems   Unable to perform ROS: Patient unresponsive       Physical Exam   BP: (!) 67/53  Pulse: 75  Heart Rate: 76  Resp: (ambu)  Weight: 85 kg (187 lb 6.3 oz)(estimated)  SpO2: 95 %      Physical Exam   Constitutional: He appears well-developed and well-nourished. He appears distressed.   HENT:   Head: Normocephalic and atraumatic.   Cardiovascular:   See resuscitation note   Pulmonary/Chest: He has  wheezes. He has rales.   Bagged respirations over ideal initially and ET tube after placement   Abdominal: He exhibits distension.   Neurological: He is unresponsive. GCS eye subscore is 1. GCS verbal subscore is 1. GCS motor subscore is 1.   Skin: Capillary refill takes more than 3 seconds.   Skin cold to touch, lower extremities mottled.  Patient is hypothermic.   Psychiatric:   Unable to assess   Vitals reviewed.      ED Course   Patient was repeatedly resuscitated as pulse was lost recurrently.  After the Cristiano was applied he would have a pulse for perhaps 5 minutes, then it would go away.  Compressions were restarted each time the pulse stopped total of 5 episodes.  Pulse was then maintained long enough for transfer to air ambulance and transport to [facility].  Endotracheal tube was placed by anesthesia, 7.0 secured at 24 at the lips, intubation successful at second attempt.  Patient received epinephrine x8, bicarb x1, TXA x1 for his esophageal bleeding.  He received 1300 cc of normal saline and was placed on a Levophed drip when his blood pressure was not maintaining with fluid resuscitation, goal is systolic blood pressure of 90 given his bleeding..  He was given 2 units of O- blood while in the emergency room second unit was running and he went to [facility].  Pupils were never reactive, they were 3 mm in size.     Procedures    EKG during ROSC: Wide QRS complex rhythm with RBBB and marked ST abnormality.  Rate 69.  Critical Care time:  was 60 minutes for this patient excluding procedures.  No results found for this or any previous visit (from the past 24 hour(s)).    Medications - No data to display    Assessments & Plan (with Medical Decision Making)   Patient is critically ill, unlikely to survive this episode.  Recurrent ROSC dictated ongoing resuscitation until family could make a decision about life support.  Family is going to meet the patient at [facility].  Spoke with Dr. Juan Cárdenas who  accepted the patient in transfer to their hospital.    I have reviewed the nursing notes.    I have reviewed the findings, diagnosis, plan and need for follow up with the patient.     Medication List      There are no discharge medications for this visit.         Final diagnoses:   Cardiac arrest (H)   Acute upper GI bleed       6/2/2019   [facility]     Yanna Watson MD  06/03/19 1214       Yanna Watson MD  06/03/19 [id number]

## 2019-06-04 LAB
DIFFERENTIAL METHOD BLD: ABNORMAL
ERYTHROCYTE [DISTWIDTH] IN BLOOD BY AUTOMATED COUNT: 16.8 % (ref 10–15)
HCT VFR BLD AUTO: 27.5 % (ref 40–53)
HGB BLD-MCNC: 8.3 G/DL (ref 13.3–17.7)
LYMPHOCYTES # BLD AUTO: 3.2 10E9/L (ref 0.8–5.3)
LYMPHOCYTES NFR BLD AUTO: 77 %
MCH RBC QN AUTO: 40.7 PG (ref 26.5–33)
MCHC RBC AUTO-ENTMCNC: 30.2 G/DL (ref 31.5–36.5)
MCV RBC AUTO: 135 FL (ref 78–100)
METAMYELOCYTES # BLD: 0 10E9/L
METAMYELOCYTES NFR BLD MANUAL: 1 %
MONOCYTES # BLD AUTO: 0 10E9/L (ref 0–1.3)
MONOCYTES NFR BLD AUTO: 1 %
MYELOCYTES # BLD: 0.1 10E9/L
MYELOCYTES NFR BLD MANUAL: 2 %
NEUTROPHILS # BLD AUTO: 0.8 10E9/L (ref 1.6–8.3)
NEUTROPHILS NFR BLD AUTO: 18 %
NRBC # BLD AUTO: 1.1 10*3/UL
NRBC BLD AUTO-RTO: 26 /100
PLATELET # BLD AUTO: 101 10E9/L (ref 150–450)
PROMYELOCYTES # BLD MANUAL: 0 10E9/L
PROMYELOCYTES NFR BLD MANUAL: 1 %
RBC # BLD AUTO: 2.04 10E12/L (ref 4.4–5.9)
WBC # BLD AUTO: 4.2 10E9/L (ref 4–11)

## 2020-01-23 NOTE — ED PROVIDER NOTES
"  History     Chief Complaint   Patient presents with     Alcohol Intoxication     notes \"not enough\" when asked how much he drank today     feet swelling     bilateral feet swelling     HPI  Andrew Virgen is a 62 year old male who came to the ED for swelling in his legs and feet.  He was talked into coming to the ED by EMS, a friend called them and expressed concern.  Apparently the police had already been to the house 3 times today.  On arrival the patient refused all care, allowed a single set of vitals and demanded to go home.  He did not remember consenting to come in, and once here he realized he would not be getting a drink and wanted to go home to \"get drunker\".  He has cool lower legs and feet, but there is a DP pulse present.  There is edema, 2+ pitting.    Problem List:    Patient Active Problem List    Diagnosis Date Noted     Hypercholesterolemia 12/04/2017     Priority: Medium     History of myocardial infarction 12/04/2017     Priority: Medium     Acidosis - suspect alcoholic ketoacidosis 09/19/2016     Priority: Medium     Hypokalemia 09/19/2016     Priority: Medium     Hypomagnesemia 09/19/2016     Priority: Medium     ETOH abuse 09/19/2016     Priority: Medium     Possible - Urinary tract infection  09/19/2016     Priority: Medium     Alcoholic ketoacidosis 09/19/2016     Priority: Medium     Acute alcohol intoxication, uncomplicated (H) 03/15/2016     Priority: Medium     Chest pain of uncertain etiology 03/15/2016     Priority: Medium        Past Medical History:    Past Medical History:   Diagnosis Date     Acute myocardial infarction of other specified sit 3/10/2011     Hypertension, benign 3/10/2011     Osteoarthrosis, unspecified whether generalized or  3/10/2011     Other and unspecified alcohol dependence, unspecif 3/10/2011       Past Surgical History:    Past Surgical History:   Procedure Laterality Date     heart surgery -1 stent  3/2009     pins in foot      fractures - right "       Family History:    Family History   Problem Relation Age of Onset     C.A.D. Brother 36     cause of death     DIABETES Brother      DIABETES Brother      HEART DISEASE Brother 39     heart disease - cause of death     Hypertension Father      MENTAL ILLNESS Brother        Social History:  Marital Status:  Single [1]  Social History   Substance Use Topics     Smoking status: Current Every Day Smoker     Packs/day: 0.50     Years: 39.00     Types: Cigarettes     Smokeless tobacco: Never Used      Comment: tried to quit - longest period tobacco free: 6 months - passive smoke exposure     Alcohol use Yes      Comment: vodka 1/2 L daily        Medications:      simvastatin (ZOCOR) 5 MG tablet   metoprolol (TOPROL-XL) 25 MG 24 hr tablet   lisinopril (PRINIVIL/ZESTRIL) 20 MG tablet   gabapentin (NEURONTIN) 300 MG capsule   clopidogrel (PLAVIX) 75 MG tablet   magnesium oxide (MAG-OX) 400 (241.3 MG) MG tablet   tamsulosin (FLOMAX) 0.4 MG capsule   folic acid (FOLVITE) 1 MG tablet   multivitamin, therapeutic with minerals (THERA-VIT-M) TABS   thiamine 100 MG tablet   nitroglycerin (NITROSTAT) 0.4 MG SL tablet         Review of Systems   Unable to perform ROS: Psychiatric disorder       Physical Exam   BP: 146/95  Heart Rate: 98  Temp: 97.9  F (36.6  C)  Resp: 18  SpO2: 97 %      Physical Exam   Nursing note and vitals reviewed.    Patient refused exam of any kind.  He pushed the nurses and I away and was yelling profanities stating her wanted to go home and that we could not keep him here.    ED Course     ED Course     Procedures  Labs Ordered and Resulted from Time of ED Arrival Up to the Time of Departure from the ED - No data to display    Assessments & Plan (with Medical Decision Making)   Patient is in his usual state of intoxication and is refusing all evaluation and cares.  He insists he is going home, and there is no basis for a hold.  EMS called to transport home, they refused.  [facility] will not transport  due to patient intoxication.  Called [employer] who agreed to take him home.  Social service discussed options with patient.  Ambulated in lobo with a walker, no problem staying upright.  He does have a walker at home, but generally crawls rather than walking with it.  He is currently at his baseline.  Follow up with primary care as needed.    I have reviewed the nursing notes.    I have reviewed the findings, diagnosis, plan and need for follow up with the patient.    Discharge Medication List as of 12/7/2017  4:27 PM          Final diagnoses:   Acute alcoholic intoxication in alcoholism without complication (H)       12/7/2017   [facility]     Yanna Watson MD  12/09/17 [id number]     No

## 2021-05-27 ENCOUNTER — RECORDS - HEALTHEAST (OUTPATIENT)
Dept: ADMINISTRATIVE | Facility: CLINIC | Age: 66
End: 2021-05-27

## 2021-05-28 ENCOUNTER — RECORDS - HEALTHEAST (OUTPATIENT)
Dept: ADMINISTRATIVE | Facility: CLINIC | Age: 66
End: 2021-05-28

## 2021-06-01 ENCOUNTER — RECORDS - HEALTHEAST (OUTPATIENT)
Dept: ADMINISTRATIVE | Facility: CLINIC | Age: 66
End: 2021-06-01